# Patient Record
Sex: MALE | Race: BLACK OR AFRICAN AMERICAN | NOT HISPANIC OR LATINO | Employment: FULL TIME | URBAN - METROPOLITAN AREA
[De-identification: names, ages, dates, MRNs, and addresses within clinical notes are randomized per-mention and may not be internally consistent; named-entity substitution may affect disease eponyms.]

---

## 2022-06-17 LAB — PSA SERPL-MCNC: 13.07 NG/ML (ref 0–4)

## 2022-12-05 ENCOUNTER — TELEPHONE (OUTPATIENT)
Dept: FAMILY MEDICINE | Facility: CLINIC | Age: 67
End: 2022-12-05
Payer: COMMERCIAL

## 2022-12-05 NOTE — LETTER
AUTHORIZATION FOR RELEASE OF   CONFIDENTIAL INFORMATION    Dear Dr. Valentine,    We are seeing Souleymane Valerokhushboo Esparza, date of birth 1955, in the clinic at [facility]. JUAN Arora is the patient's PCP. Souleymane NORRIS Pamela Esparza has an outstanding lab/procedure at the time we reviewed his chart. In order to help keep his health information updated, he has authorized us to request the following medical record(s):        (  )  MAMMOGRAM                                      (  )  COLONOSCOPY      (  )  PAP SMEAR                                          ( x )  OUTSIDE LAB RESULTS     (  )  DEXA SCAN                                          (  )  EYE EXAM            (  )  FOOT EXAM                                          (  )  ENTIRE RECORD     (  )  OUTSIDE IMMUNIZATIONS                 (x  )  MOST RECENT OFFICE NOTE       Please fax records to [facility], Kathryn F Duplantis, FNP, [phone]     If you have any questions, please contact [phone]          Patient Name: Souleymane Valerokhushboo Esparza  : 1955  Patient Phone #: [phone]

## 2022-12-12 ENCOUNTER — OFFICE VISIT (OUTPATIENT)
Dept: FAMILY MEDICINE | Facility: CLINIC | Age: 67
End: 2022-12-12
Payer: COMMERCIAL

## 2022-12-12 VITALS
SYSTOLIC BLOOD PRESSURE: 136 MMHG | TEMPERATURE: 99 F | BODY MASS INDEX: 30.39 KG/M2 | HEART RATE: 58 BPM | WEIGHT: 205.19 LBS | HEIGHT: 69 IN | RESPIRATION RATE: 16 BRPM | OXYGEN SATURATION: 99 % | DIASTOLIC BLOOD PRESSURE: 84 MMHG

## 2022-12-12 DIAGNOSIS — Z00.00 ANNUAL PHYSICAL EXAM: ICD-10-CM

## 2022-12-12 DIAGNOSIS — Z01.818 PREOPERATIVE CLEARANCE: ICD-10-CM

## 2022-12-12 DIAGNOSIS — Z11.59 NEED FOR HEPATITIS C SCREENING TEST: ICD-10-CM

## 2022-12-12 DIAGNOSIS — N40.1 BENIGN PROSTATIC HYPERPLASIA WITH LOWER URINARY TRACT SYMPTOMS, SYMPTOM DETAILS UNSPECIFIED: ICD-10-CM

## 2022-12-12 DIAGNOSIS — Z11.4 SCREENING FOR HIV (HUMAN IMMUNODEFICIENCY VIRUS): ICD-10-CM

## 2022-12-12 DIAGNOSIS — Z76.89 ENCOUNTER TO ESTABLISH CARE: Primary | ICD-10-CM

## 2022-12-12 PROCEDURE — 3079F PR MOST RECENT DIASTOLIC BLOOD PRESSURE 80-89 MM HG: ICD-10-PCS | Mod: CPTII,,, | Performed by: NURSE PRACTITIONER

## 2022-12-12 PROCEDURE — 1159F MED LIST DOCD IN RCRD: CPT | Mod: CPTII,,, | Performed by: NURSE PRACTITIONER

## 2022-12-12 PROCEDURE — 3079F DIAST BP 80-89 MM HG: CPT | Mod: CPTII,,, | Performed by: NURSE PRACTITIONER

## 2022-12-12 PROCEDURE — 99203 OFFICE O/P NEW LOW 30 MIN: CPT | Mod: ,,, | Performed by: NURSE PRACTITIONER

## 2022-12-12 PROCEDURE — 1159F PR MEDICATION LIST DOCUMENTED IN MEDICAL RECORD: ICD-10-PCS | Mod: CPTII,,, | Performed by: NURSE PRACTITIONER

## 2022-12-12 PROCEDURE — 3008F BODY MASS INDEX DOCD: CPT | Mod: CPTII,,, | Performed by: NURSE PRACTITIONER

## 2022-12-12 PROCEDURE — 1160F PR REVIEW ALL MEDS BY PRESCRIBER/CLIN PHARMACIST DOCUMENTED: ICD-10-PCS | Mod: CPTII,,, | Performed by: NURSE PRACTITIONER

## 2022-12-12 PROCEDURE — 99203 PR OFFICE/OUTPT VISIT, NEW, LEVL III, 30-44 MIN: ICD-10-PCS | Mod: ,,, | Performed by: NURSE PRACTITIONER

## 2022-12-12 PROCEDURE — 3075F PR MOST RECENT SYSTOLIC BLOOD PRESS GE 130-139MM HG: ICD-10-PCS | Mod: CPTII,,, | Performed by: NURSE PRACTITIONER

## 2022-12-12 PROCEDURE — 3075F SYST BP GE 130 - 139MM HG: CPT | Mod: CPTII,,, | Performed by: NURSE PRACTITIONER

## 2022-12-12 PROCEDURE — 3008F PR BODY MASS INDEX (BMI) DOCUMENTED: ICD-10-PCS | Mod: CPTII,,, | Performed by: NURSE PRACTITIONER

## 2022-12-12 PROCEDURE — 1160F RVW MEDS BY RX/DR IN RCRD: CPT | Mod: CPTII,,, | Performed by: NURSE PRACTITIONER

## 2022-12-12 RX ORDER — TAMSULOSIN HYDROCHLORIDE 0.4 MG/1
1 CAPSULE ORAL NIGHTLY
COMMUNITY
Start: 2022-09-17 | End: 2024-03-26

## 2022-12-12 NOTE — LETTER
December 12, 2022      [facility]  [address] [place] [address]  Phone: [phone]       Patient: Souleymane Santiago   YOB: 1955  Date of Visit: 12/12/2022    To Whom It May Concern:    Toro Santiago  was at [facility] on 12/12/2022. The patient may return to work/school on 12/13/22 with no restrictions. If you have any questions or concerns, or if I can be of further assistance, please do not hesitate to contact me.    Sincerely,    Renuka Yoo MA

## 2022-12-12 NOTE — Clinical Note
Patient has multiple PSA results in his media from Dr. Bravo.  Please update his health maintenance with PSA

## 2022-12-12 NOTE — PROGRESS NOTES
Subjective:       Patient ID: Souleymane Santiago Sr. is a 67 y.o. male.    Chief Complaint: Establish Care and Benign Prostatic Hypertrophy (Has an enlarged prostate and is scheduled for surgery in February.)      HPI   This is a 67-year-old  male who presents to clinic today to establish care.  Patient has a history of BPH with obstruction.  Is being followed by Dr. Shoaib Valentine and planning on having his prostate removed in February with Dr. Bravo.  Patient has not had a primary care physician ever as an adult.  Has no past medical history besides BPH.  Review of Systems  Comprehensive review of systems negative except as stated in HPI    The patient's Health Maintenance was reviewed and the following appears to be due:   Health Maintenance Due   Topic Date Due    PROSTATE-SPECIFIC ANTIGEN  Never done    Hepatitis C Screening  Never done    Lipid Panel  Never done    HIV Screening  Never done    TETANUS VACCINE  Never done    Colorectal Cancer Screening  Never done    Shingles Vaccine (1 of 2) Never done    COVID-19 Vaccine (3 - Booster) 06/10/2021    Pneumococcal Vaccines (Age 65+) (1 - PCV) Never done    Abdominal Aortic Aneurysm Screening  Never done    Influenza Vaccine (1) Never done       Past Medical History:  Past Medical History:   Diagnosis Date    Enlarged prostate      History reviewed. No pertinent surgical history.  Review of patient's allergies indicates:  No Known Allergies  Current Outpatient Medications on File Prior to Visit   Medication Sig Dispense Refill    tamsulosin (FLOMAX) 0.4 mg Cap Take 1 capsule by mouth every evening.       No current facility-administered medications on file prior to visit.     Social History     Socioeconomic History    Marital status:    Tobacco Use    Smoking status: Former     Types: Cigarettes     Quit date: 2012     Years since quitting: 10.9    Smokeless tobacco: Never   Substance and Sexual Activity    Alcohol use: Not Currently    Drug  "use: Never    Sexual activity: Yes     Family History   Problem Relation Age of Onset    Heart disease Mother     Heart disease Father     Diabetes Sister     Diabetes Brother        Objective:       /84 (BP Location: Left arm)   Pulse (!) 58   Temp 98.9 °F (37.2 °C) (Oral)   Resp 16   Ht 5' 9" (1.753 m)   Wt 93.1 kg (205 lb 3.2 oz)   SpO2 99%   BMI 30.30 kg/m²      Physical Exam  Vitals and nursing note reviewed.   Constitutional:       Appearance: Normal appearance. He is obese.   HENT:      Head: Normocephalic and atraumatic.      Right Ear: Tympanic membrane, ear canal and external ear normal.      Left Ear: Tympanic membrane, ear canal and external ear normal.      Nose: Nose normal.      Mouth/Throat:      Mouth: Mucous membranes are moist.      Pharynx: Oropharynx is clear.   Eyes:      Extraocular Movements: Extraocular movements intact.      Conjunctiva/sclera: Conjunctivae normal.      Pupils: Pupils are equal, round, and reactive to light.   Cardiovascular:      Rate and Rhythm: Normal rate and regular rhythm.      Pulses: Normal pulses.      Heart sounds: Normal heart sounds.   Pulmonary:      Effort: Pulmonary effort is normal.      Breath sounds: Normal breath sounds.   Musculoskeletal:         General: Normal range of motion.      Cervical back: Normal range of motion and neck supple.   Skin:     General: Skin is warm and dry.   Neurological:      General: No focal deficit present.      Mental Status: He is alert and oriented to person, place, and time.   Psychiatric:         Mood and Affect: Mood normal.         Behavior: Behavior normal.         Thought Content: Thought content normal.         Judgment: Judgment normal.           Assessment and Plan       ICD-10-CM ICD-9-CM   1. Encounter to establish care  Z76.89 V65.8   2. Benign prostatic hyperplasia with lower urinary tract symptoms, symptom details unspecified  N40.1 600.01   3. Annual physical exam  Z00.00 V70.0   4. Need for " hepatitis C screening test  Z11.59 V73.89   5. Screening for HIV (human immunodeficiency virus)  Z11.4 V73.89   6. Preoperative clearance  Z01.818 V72.84        1. Encounter to establish care  Comments:  Return to clinic in 1 month for wellness and to discuss health maintenance issues.    2. Benign prostatic hyperplasia with lower urinary tract symptoms, symptom details unspecified  Overview:  Managed by Dr. Shoaib Valentine and Dr. Bravo      Assessment & Plan:  Needs clearance for radical prostatectomy, will return to clinic in 1 month for wellness and surgery clearance.      3. Annual physical exam  -     CBC Auto Differential; Future; Expected date: 01/16/2023  -     Comprehensive Metabolic Panel; Future; Expected date: 01/16/2023  -     Lipid Panel; Future; Expected date: 01/16/2023  -     TSH; Future; Expected date: 01/16/2023  -     Urinalysis; Future; Expected date: 01/16/2023    4. Need for hepatitis C screening test  -     Hepatitis C Antibody; Future; Expected date: 01/16/2023    5. Screening for HIV (human immunodeficiency virus)  -     HIV 1/2 Ag/Ab (4th Gen); Future; Expected date: 01/16/2023    6. Preoperative clearance  -     EKG 12-lead; Future; Expected date: 01/16/2023  -     X-Ray Chest PA And Lateral; Future; Expected date: 01/16/2023  -     Protime-INR; Future; Expected date: 01/16/2023           Follow up in about 6 weeks (around 1/23/2023) for Annual/preoperative clearance .

## 2022-12-12 NOTE — ASSESSMENT & PLAN NOTE
Needs clearance for radical prostatectomy, will return to clinic in 1 month for wellness and surgery clearance.

## 2022-12-13 ENCOUNTER — DOCUMENTATION ONLY (OUTPATIENT)
Dept: FAMILY MEDICINE | Facility: CLINIC | Age: 67
End: 2022-12-13
Payer: COMMERCIAL

## 2023-01-11 ENCOUNTER — TELEPHONE (OUTPATIENT)
Dept: FAMILY MEDICINE | Facility: CLINIC | Age: 68
End: 2023-01-11
Payer: COMMERCIAL

## 2023-01-11 NOTE — TELEPHONE ENCOUNTER
Are there any outstanding tasks in patient's chart?    labs  2. Do we have outstanding/pending referrals?  n    3. Has the patient been seen in an ER, Urgent Care, or admitted since last visit?  n    4. Has patient seen any other health care providers since last visit?  Dr. Bravo    5.  Has patient had any blood work or x-rays done since last visit?   Patient will complete labs at [facility]    Lab order in system    Scheduled for surgery with Dr. Bravo on Feb 15

## 2023-01-19 ENCOUNTER — HOSPITAL ENCOUNTER (OUTPATIENT)
Dept: RADIOLOGY | Facility: HOSPITAL | Age: 68
Discharge: HOME | End: 2023-01-19
Attending: NURSE PRACTITIONER
Payer: COMMERCIAL

## 2023-01-19 ENCOUNTER — HOSPITAL ENCOUNTER (OUTPATIENT)
Dept: CARDIOLOGY | Facility: HOSPITAL | Age: 68
Discharge: HOME | End: 2023-01-19
Attending: NURSE PRACTITIONER
Payer: COMMERCIAL

## 2023-01-19 DIAGNOSIS — Z01.818 PREOPERATIVE CLEARANCE: ICD-10-CM

## 2023-01-19 PROCEDURE — 93010 EKG 12-LEAD: ICD-10-PCS | Mod: ,,, | Performed by: INTERNAL MEDICINE

## 2023-01-19 PROCEDURE — 71046 X-RAY EXAM CHEST 2 VIEWS: CPT | Mod: TC

## 2023-01-19 PROCEDURE — 93010 ELECTROCARDIOGRAM REPORT: CPT | Mod: ,,, | Performed by: INTERNAL MEDICINE

## 2023-01-19 PROCEDURE — 93005 ELECTROCARDIOGRAM TRACING: CPT

## 2023-01-23 ENCOUNTER — TELEPHONE (OUTPATIENT)
Dept: FAMILY MEDICINE | Facility: CLINIC | Age: 68
End: 2023-01-23
Payer: COMMERCIAL

## 2023-01-23 NOTE — TELEPHONE ENCOUNTER
Are there any outstanding tasks in patient's chart?  n    2. Do we have outstanding/pending referrals?  n    3. Has the patient been seen in an ER, Urgent Care, or admitted since last visit?  n    4. Has patient seen any other health care providers since last visit?  Dr. Bravo    5.  Has patient had any blood work or x-rays done since last visit?   Labs and chest xray completed

## 2023-01-30 ENCOUNTER — OFFICE VISIT (OUTPATIENT)
Dept: FAMILY MEDICINE | Facility: CLINIC | Age: 68
End: 2023-01-30
Payer: COMMERCIAL

## 2023-01-30 VITALS
TEMPERATURE: 98 F | DIASTOLIC BLOOD PRESSURE: 92 MMHG | OXYGEN SATURATION: 99 % | HEIGHT: 69 IN | HEART RATE: 76 BPM | RESPIRATION RATE: 16 BRPM | SYSTOLIC BLOOD PRESSURE: 140 MMHG | BODY MASS INDEX: 31.1 KG/M2 | WEIGHT: 210 LBS

## 2023-01-30 DIAGNOSIS — Z12.11 COLON CANCER SCREENING: ICD-10-CM

## 2023-01-30 DIAGNOSIS — E78.2 MIXED HYPERLIPIDEMIA: ICD-10-CM

## 2023-01-30 DIAGNOSIS — N40.1 BENIGN PROSTATIC HYPERPLASIA WITH LOWER URINARY TRACT SYMPTOMS, SYMPTOM DETAILS UNSPECIFIED: ICD-10-CM

## 2023-01-30 DIAGNOSIS — Z11.4 SCREENING FOR HIV (HUMAN IMMUNODEFICIENCY VIRUS): ICD-10-CM

## 2023-01-30 DIAGNOSIS — Z23 NEED FOR PNEUMOCOCCAL VACCINATION: ICD-10-CM

## 2023-01-30 DIAGNOSIS — Z01.818 PREOPERATIVE CLEARANCE: ICD-10-CM

## 2023-01-30 DIAGNOSIS — Z00.00 ANNUAL PHYSICAL EXAM: Primary | ICD-10-CM

## 2023-01-30 DIAGNOSIS — R03.0 ELEVATED BLOOD PRESSURE READING: ICD-10-CM

## 2023-01-30 DIAGNOSIS — Z11.59 NEED FOR HEPATITIS C SCREENING TEST: ICD-10-CM

## 2023-01-30 PROCEDURE — 3288F FALL RISK ASSESSMENT DOCD: CPT | Mod: CPTII,,, | Performed by: NURSE PRACTITIONER

## 2023-01-30 PROCEDURE — 1101F PT FALLS ASSESS-DOCD LE1/YR: CPT | Mod: CPTII,,, | Performed by: NURSE PRACTITIONER

## 2023-01-30 PROCEDURE — 1101F PR PT FALLS ASSESS DOC 0-1 FALLS W/OUT INJ PAST YR: ICD-10-PCS | Mod: CPTII,,, | Performed by: NURSE PRACTITIONER

## 2023-01-30 PROCEDURE — 1160F PR REVIEW ALL MEDS BY PRESCRIBER/CLIN PHARMACIST DOCUMENTED: ICD-10-PCS | Mod: CPTII,,, | Performed by: NURSE PRACTITIONER

## 2023-01-30 PROCEDURE — 90677 PCV20 VACCINE IM: CPT | Mod: ,,, | Performed by: NURSE PRACTITIONER

## 2023-01-30 PROCEDURE — 1126F AMNT PAIN NOTED NONE PRSNT: CPT | Mod: CPTII,,, | Performed by: NURSE PRACTITIONER

## 2023-01-30 PROCEDURE — 3288F PR FALLS RISK ASSESSMENT DOCUMENTED: ICD-10-PCS | Mod: CPTII,,, | Performed by: NURSE PRACTITIONER

## 2023-01-30 PROCEDURE — 3080F DIAST BP >= 90 MM HG: CPT | Mod: CPTII,,, | Performed by: NURSE PRACTITIONER

## 2023-01-30 PROCEDURE — 99397 PR PREVENTIVE VISIT,EST,65 & OVER: ICD-10-PCS | Mod: 25,,, | Performed by: NURSE PRACTITIONER

## 2023-01-30 PROCEDURE — 99397 PER PM REEVAL EST PAT 65+ YR: CPT | Mod: 25,,, | Performed by: NURSE PRACTITIONER

## 2023-01-30 PROCEDURE — 1159F PR MEDICATION LIST DOCUMENTED IN MEDICAL RECORD: ICD-10-PCS | Mod: CPTII,,, | Performed by: NURSE PRACTITIONER

## 2023-01-30 PROCEDURE — 3080F PR MOST RECENT DIASTOLIC BLOOD PRESSURE >= 90 MM HG: ICD-10-PCS | Mod: CPTII,,, | Performed by: NURSE PRACTITIONER

## 2023-01-30 PROCEDURE — 3008F BODY MASS INDEX DOCD: CPT | Mod: CPTII,,, | Performed by: NURSE PRACTITIONER

## 2023-01-30 PROCEDURE — 90471 PNEUMOCOCCAL CONJUGATE VACCINE 20-VALENT: ICD-10-PCS | Mod: ,,, | Performed by: NURSE PRACTITIONER

## 2023-01-30 PROCEDURE — 1126F PR PAIN SEVERITY QUANTIFIED, NO PAIN PRESENT: ICD-10-PCS | Mod: CPTII,,, | Performed by: NURSE PRACTITIONER

## 2023-01-30 PROCEDURE — 90677 PNEUMOCOCCAL CONJUGATE VACCINE 20-VALENT: ICD-10-PCS | Mod: ,,, | Performed by: NURSE PRACTITIONER

## 2023-01-30 PROCEDURE — 90471 IMMUNIZATION ADMIN: CPT | Mod: ,,, | Performed by: NURSE PRACTITIONER

## 2023-01-30 PROCEDURE — 1160F RVW MEDS BY RX/DR IN RCRD: CPT | Mod: CPTII,,, | Performed by: NURSE PRACTITIONER

## 2023-01-30 PROCEDURE — 3008F PR BODY MASS INDEX (BMI) DOCUMENTED: ICD-10-PCS | Mod: CPTII,,, | Performed by: NURSE PRACTITIONER

## 2023-01-30 PROCEDURE — 1159F MED LIST DOCD IN RCRD: CPT | Mod: CPTII,,, | Performed by: NURSE PRACTITIONER

## 2023-01-30 PROCEDURE — 3077F PR MOST RECENT SYSTOLIC BLOOD PRESSURE >= 140 MM HG: ICD-10-PCS | Mod: CPTII,,, | Performed by: NURSE PRACTITIONER

## 2023-01-30 PROCEDURE — 3077F SYST BP >= 140 MM HG: CPT | Mod: CPTII,,, | Performed by: NURSE PRACTITIONER

## 2023-01-30 NOTE — ASSESSMENT & PLAN NOTE
Blood pressure mildly elevated in clinic today.  Was borderline at visit last month.  No history of hypertension.  Patient is nervous about upcoming surgery.  Discussed with both him and his wife that he needs to get a blood pressure cuff and start monitoring his blood pressure daily and record.  We will give him a call in 3-4 weeks to see how his blood pressure is.

## 2023-01-30 NOTE — PROGRESS NOTES
"Subjective:       Patient ID: Souleymane Santiago Sr. is a 68 y.o. male.    Chief Complaint: Annual Exam      HPI   This is a 68-year-old  male who presents to clinic today for an annual wellness exam as well as preoperative clearance for radical prostatectomy.  Patient's only past medical history is BPH with urinary tract symptoms.  Scheduled for radical prostatectomy with Dr. Bravo on .  Patient has no complaints today.    Review of Systems  Comprehensive review of systems negative except as stated in HPI    The patient's Health Maintenance was reviewed and the following appears to be due:   Health Maintenance Due   Topic Date Due    Colorectal Cancer Screening  Never done       Past Medical History:  Past Medical History:   Diagnosis Date    Enlarged prostate      History reviewed. No pertinent surgical history.  Review of patient's allergies indicates:  No Known Allergies  Current Outpatient Medications on File Prior to Visit   Medication Sig Dispense Refill    tamsulosin (FLOMAX) 0.4 mg Cap Take 1 capsule by mouth every evening.       No current facility-administered medications on file prior to visit.     Social History     Socioeconomic History    Marital status:    Tobacco Use    Smoking status: Former     Types: Cigarettes     Quit date:      Years since quittin.0    Smokeless tobacco: Never   Substance and Sexual Activity    Alcohol use: Not Currently    Drug use: Never    Sexual activity: Yes     Family History   Problem Relation Age of Onset    Heart disease Mother     Heart disease Father     Diabetes Sister     Diabetes Brother        Objective:       BP (!) 140/92   Pulse 76   Temp 97.6 °F (36.4 °C) (Oral)   Resp 16   Ht 5' 9" (1.753 m)   Wt 95.3 kg (210 lb)   SpO2 99%   BMI 31.01 kg/m²      Physical Exam  Vitals and nursing note reviewed.   Constitutional:       Appearance: Normal appearance. He is obese.   HENT:      Head: Normocephalic and atraumatic. "      Right Ear: Tympanic membrane, ear canal and external ear normal.      Left Ear: Tympanic membrane, ear canal and external ear normal.      Nose: Nose normal.      Mouth/Throat:      Mouth: Mucous membranes are moist.      Pharynx: Oropharynx is clear.   Eyes:      Extraocular Movements: Extraocular movements intact.      Conjunctiva/sclera: Conjunctivae normal.      Pupils: Pupils are equal, round, and reactive to light.   Cardiovascular:      Rate and Rhythm: Normal rate and regular rhythm.      Pulses: Normal pulses.      Heart sounds: Normal heart sounds.   Pulmonary:      Effort: Pulmonary effort is normal.      Breath sounds: Normal breath sounds.   Abdominal:      General: Abdomen is flat. Bowel sounds are normal.      Palpations: Abdomen is soft.   Musculoskeletal:         General: Normal range of motion.      Cervical back: Normal range of motion and neck supple.   Skin:     General: Skin is warm and dry.   Neurological:      General: No focal deficit present.      Mental Status: He is alert and oriented to person, place, and time.   Psychiatric:         Mood and Affect: Mood normal.         Behavior: Behavior normal.         Thought Content: Thought content normal.         Judgment: Judgment normal.       Labs  Lab Visit on 01/19/2023   Component Date Value Ref Range Status    Sodium Level 01/19/2023 140  136 - 145 mmol/L Final    Potassium Level 01/19/2023 4.0  3.5 - 5.1 mmol/L Final    Chloride 01/19/2023 107  98 - 107 mmol/L Final    Carbon Dioxide 01/19/2023 24  23 - 31 mmol/L Final    Glucose Level 01/19/2023 100  82 - 115 mg/dL Final    Blood Urea Nitrogen 01/19/2023 20.2  8.4 - 25.7 mg/dL Final    Creatinine 01/19/2023 1.09  0.73 - 1.18 mg/dL Final    Calcium Level Total 01/19/2023 9.7  8.8 - 10.0 mg/dL Final    Protein Total 01/19/2023 7.2  5.8 - 7.6 gm/dL Final    Albumin Level 01/19/2023 3.9  3.4 - 4.8 g/dL Final    Globulin 01/19/2023 3.3  2.4 - 3.5 gm/dL Final    Albumin/Globulin Ratio  01/19/2023 1.2  1.1 - 2.0 ratio Final    Bilirubin Total 01/19/2023 0.4  <=1.5 mg/dL Final    Alkaline Phosphatase 01/19/2023 67  40 - 150 unit/L Final    Alanine Aminotransferase 01/19/2023 32  0 - 55 unit/L Final    Aspartate Aminotransferase 01/19/2023 30  5 - 34 unit/L Final    eGFR 01/19/2023 >60  mls/min/1.73/m2 Final    Cholesterol Total 01/19/2023 225 (H)  <=200 mg/dL Final    HDL Cholesterol 01/19/2023 49  35 - 60 mg/dL Final    Triglyceride 01/19/2023 199 (H)  34 - 140 mg/dL Final    Cholesterol/HDL Ratio 01/19/2023 5  0 - 5 Final    Very Low Density Lipoprotein 01/19/2023 40   Final    LDL Cholesterol 01/19/2023 136.00  50.00 - 140.00 mg/dL Final    Thyroid Stimulating Hormone 01/19/2023 1.197  0.350 - 4.940 uIU/mL Final    Hepatitis C Antibody 01/19/2023 Nonreactive  Nonreactive Final    HIV 01/19/2023 Nonreactive  Nonreactive Final    PT 01/19/2023 10.4 (L)  12.5 - 14.5 seconds Final    INR 01/19/2023 1.03  0.00 - 1.30 Final    WBC 01/19/2023 8.2  4.5 - 11.5 x10(3)/mcL Final    RBC 01/19/2023 5.21  4.70 - 6.10 x10(6)/mcL Final    Hgb 01/19/2023 14.9  14.0 - 18.0 gm/dL Final    Hct 01/19/2023 47.2  42.0 - 52.0 % Final    MCV 01/19/2023 90.6  80.0 - 94.0 fL Final    MCH 01/19/2023 28.6  pg Final    MCHC 01/19/2023 31.6 (L)  33.0 - 36.0 mg/dL Final    RDW 01/19/2023 13.0  11.5 - 17.0 % Final    Platelet 01/19/2023 268  130 - 400 x10(3)/mcL Final    MPV 01/19/2023 8.5  7.4 - 10.4 fL Final    Neut % 01/19/2023 55.1  % Final    Lymph % 01/19/2023 35.9  % Final    Mono % 01/19/2023 6.5  % Final    Eos % 01/19/2023 2.1  % Final    Basophil % 01/19/2023 0.4  % Final    Lymph # 01/19/2023 2.93  0.6 - 4.6 x10(3)/mcL Final    Neut # 01/19/2023 4.50  2.1 - 9.2 x10(3)/mcL Final    Mono # 01/19/2023 0.53  0.1 - 1.3 x10(3)/mcL Final    Eos # 01/19/2023 0.17  0 - 0.9 x10(3)/mcL Final    Baso # 01/19/2023 0.03  0 - 0.2 x10(3)/mcL Final    IG# 01/19/2023 0.00  0 - 0.04 x10(3)/mcL Final    IG% 01/19/2023 0.0  % Final    Documentation Only on 12/13/2022   Component Date Value Ref Range Status    PSA Total 06/17/2022 13.07 (H)  0.00 - 4.00 ng/mL Final     X-Ray Chest PA And Lateral  Narrative: EXAMINATION:  XR CHEST PA AND LATERAL    CLINICAL HISTORY:  Encounter for other preprocedural examination    TECHNIQUE:  Single frontal view of the chest was performed.    COMPARISON:  None    FINDINGS:  The lungs are clear, with normal appearance of pulmonary vasculature and no pleural effusion or pneumothorax.    The cardiac silhouette is normal in size. The hilar and mediastinal contours are unremarkable.    Bones are intact.  Impression: No acute abnormality.    Electronically signed by: Stormy John  Date:    01/19/2023  Time:    18:54     Results for orders placed or performed during the hospital encounter of 01/19/23   EKG 12-lead    Collection Time: 01/19/23  5:28 PM    Narrative    Test Reason : Z01.818,    Vent. Rate : 059 BPM     Atrial Rate : 059 BPM     P-R Int : 154 ms          QRS Dur : 090 ms      QT Int : 390 ms       P-R-T Axes : 033 031 020 degrees     QTc Int : 386 ms    Sinus bradycardia  Otherwise normal ECG  No previous ECGs available  Confirmed by Lisandro Moya MD ([id number]) on 1/21/2023 3:25:01 AM    Referred By: GISELLE RANDHAWA           Confirmed By:Lisandro Moya MD        Assessment and Plan       ICD-10-CM ICD-9-CM   1. Annual physical exam  Z00.00 V70.0   2. Preoperative clearance  Z01.818 V72.84   3. Need for hepatitis C screening test  Z11.59 V73.89   4. Screening for HIV (human immunodeficiency virus)  Z11.4 V73.89   5. Colon cancer screening  Z12.11 V76.51   6. Need for pneumococcal vaccination  Z23 V03.82   7. Benign prostatic hyperplasia with lower urinary tract symptoms, symptom details unspecified  N40.1 600.01   8. Elevated blood pressure reading  R03.0 796.2   9. Mixed hyperlipidemia  E78.2 272.2        1. Annual physical exam  Overview:  Annual exam yearly in January    Orders:  -     CBC Auto  Differential; Future; Expected date: 01/30/2024  -     Comprehensive Metabolic Panel; Future; Expected date: 01/30/2024  -     Lipid Panel; Future; Expected date: 01/30/2024  -     TSH; Future; Expected date: 01/30/2024  -     Hemoglobin A1C; Future; Expected date: 01/30/2024    2. Preoperative clearance  Comments:  EKG, chest x-ray, labs all reviewed, patient cleared for robotic simple prostatectomy.  H&P, labs, EKG, chest x-ray faxed to Dr. Bravo's office.    3. Need for hepatitis C screening test  Comments:  Nonreactive    4. Screening for HIV (human immunodeficiency virus)  Comments:  Nonreactive    5. Colon cancer screening  Assessment & Plan:  Discussed colon cancer screening options with patient including colonoscopy and Cologuard.  Patient would like to complete a colonoscopy, referral sent to [facility].     Orders:  -     Ambulatory referral/consult to Gastroenterology; Future; Expected date: 02/06/2023    6. Need for pneumococcal vaccination  -     (In Office Administered) Pneumococcal Conjugate Vaccine (20 Valent) (IM)    7. Benign prostatic hyperplasia with lower urinary tract symptoms, symptom details unspecified  Overview:  Managed by Dr. Shoaib Valentine and Dr. Bravo      Assessment & Plan:  Cleared for simple robotic prostatectomy to be completed on February 15th with Dr. Bravo.      8. Elevated blood pressure reading  Assessment & Plan:  Blood pressure mildly elevated in clinic today.  Was borderline at visit last month.  No history of hypertension.  Patient is nervous about upcoming surgery.  Discussed with both him and his wife that he needs to get a blood pressure cuff and start monitoring his blood pressure daily and record.  We will give him a call in 3-4 weeks to see how his blood pressure is.      9. Mixed hyperlipidemia  Assessment & Plan:  Total cholesterol 225, HDL 49, triglycerides 199, .  Discussed lifestyle and diet modification.  Will recheck in 1 year.             Follow  up in about 1 year (around 1/30/2024) for Annual.

## 2023-01-30 NOTE — ASSESSMENT & PLAN NOTE
Discussed colon cancer screening options with patient including colonoscopy and Cologuard.  Patient would like to complete a colonoscopy, referral sent to [facility].

## 2023-01-30 NOTE — ASSESSMENT & PLAN NOTE
Total cholesterol 225, HDL 49, triglycerides 199, .  Discussed lifestyle and diet modification.  Will recheck in 1 year.

## 2023-04-25 ENCOUNTER — PATIENT MESSAGE (OUTPATIENT)
Dept: RESEARCH | Facility: HOSPITAL | Age: 68
End: 2023-04-25
Payer: COMMERCIAL

## 2023-05-01 ENCOUNTER — PATIENT MESSAGE (OUTPATIENT)
Dept: ADMINISTRATIVE | Facility: HOSPITAL | Age: 68
End: 2023-05-01
Payer: COMMERCIAL

## 2023-05-01 PROBLEM — Z00.00 ANNUAL PHYSICAL EXAM: Status: RESOLVED | Noted: 2023-01-30 | Resolved: 2023-05-01

## 2023-05-02 ENCOUNTER — PATIENT MESSAGE (OUTPATIENT)
Dept: RESEARCH | Facility: HOSPITAL | Age: 68
End: 2023-05-02
Payer: COMMERCIAL

## 2023-05-10 LAB — PSA: 0.77

## 2023-05-16 ENCOUNTER — PATIENT MESSAGE (OUTPATIENT)
Dept: RESEARCH | Facility: HOSPITAL | Age: 68
End: 2023-05-16
Payer: COMMERCIAL

## 2023-07-25 ENCOUNTER — PATIENT MESSAGE (OUTPATIENT)
Dept: ADMINISTRATIVE | Facility: HOSPITAL | Age: 68
End: 2023-07-25
Payer: COMMERCIAL

## 2023-08-11 ENCOUNTER — HOSPITAL ENCOUNTER (EMERGENCY)
Facility: HOSPITAL | Age: 68
Discharge: HOME | End: 2023-08-11
Attending: SPECIALIST
Payer: COMMERCIAL

## 2023-08-11 VITALS
TEMPERATURE: 98 F | SYSTOLIC BLOOD PRESSURE: 159 MMHG | BODY MASS INDEX: 32.49 KG/M2 | OXYGEN SATURATION: 97 % | HEART RATE: 59 BPM | WEIGHT: 220 LBS | DIASTOLIC BLOOD PRESSURE: 79 MMHG | RESPIRATION RATE: 18 BRPM

## 2023-08-11 DIAGNOSIS — M54.2 NECK PAIN: ICD-10-CM

## 2023-08-11 PROCEDURE — 96372 THER/PROPH/DIAG INJ SC/IM: CPT | Performed by: SPECIALIST

## 2023-08-11 PROCEDURE — 99284 EMERGENCY DEPT VISIT MOD MDM: CPT

## 2023-08-11 PROCEDURE — 25000003 PHARM REV CODE 250: Performed by: SPECIALIST

## 2023-08-11 PROCEDURE — 63600175 PHARM REV CODE 636 W HCPCS: Performed by: SPECIALIST

## 2023-08-11 RX ORDER — KETOROLAC TROMETHAMINE 30 MG/ML
60 INJECTION, SOLUTION INTRAMUSCULAR; INTRAVENOUS
Status: COMPLETED | OUTPATIENT
Start: 2023-08-11 | End: 2023-08-11

## 2023-08-11 RX ORDER — CYCLOBENZAPRINE HCL 10 MG
10 TABLET ORAL
Status: COMPLETED | OUTPATIENT
Start: 2023-08-11 | End: 2023-08-11

## 2023-08-11 RX ORDER — DICLOFENAC SODIUM 50 MG/1
50 TABLET, DELAYED RELEASE ORAL 3 TIMES DAILY PRN
Qty: 30 TABLET | Refills: 0 | Status: SHIPPED | OUTPATIENT
Start: 2023-08-11 | End: 2024-03-26

## 2023-08-11 RX ORDER — CYCLOBENZAPRINE HCL 5 MG
5 TABLET ORAL 3 TIMES DAILY PRN
Qty: 30 TABLET | Refills: 0 | Status: SHIPPED | OUTPATIENT
Start: 2023-08-11 | End: 2023-08-21

## 2023-08-11 RX ADMIN — KETOROLAC TROMETHAMINE 60 MG: 60 INJECTION, SOLUTION INTRAMUSCULAR at 09:08

## 2023-08-11 RX ADMIN — CYCLOBENZAPRINE 10 MG: 10 TABLET, FILM COATED ORAL at 09:08

## 2023-08-12 NOTE — ED PROVIDER NOTES
Encounter Date: 2023       History     Chief Complaint   Patient presents with    Neck Pain     Non traumatic neck pain x 3 days with difficulty sleeping. Pt taking ibuprofen 400mg but not helping very well. Last dose last night     Patient with left lateral neck pain over the last 3 days, no injury, no focal deficits; he notes pain radiating from the left lateral neck into the trapezius area; states it is hard to sleep; no relief with ibuprofen    The history is provided by the patient.     Review of patient's allergies indicates:  No Known Allergies  Past Medical History:   Diagnosis Date    Enlarged prostate      No past surgical history on file.  Family History   Problem Relation Age of Onset    Heart disease Mother     Heart disease Father     Diabetes Sister     Diabetes Brother      Social History     Tobacco Use    Smoking status: Former     Current packs/day: 0.00     Types: Cigarettes     Quit date:      Years since quittin.6    Smokeless tobacco: Never   Substance Use Topics    Alcohol use: Not Currently    Drug use: Never     Review of Systems   Constitutional: Negative.    HENT: Negative.     Respiratory: Negative.     Cardiovascular: Negative.    Gastrointestinal: Negative.    Genitourinary: Negative.    Musculoskeletal: Negative.    Neurological: Negative.        Physical Exam     Initial Vitals [23]   BP Pulse Resp Temp SpO2   (!) 159/79 (!) 59 18 97.9 °F (36.6 °C) 97 %      MAP       --         Physical Exam    Nursing note and vitals reviewed.  Constitutional: He appears well-developed and well-nourished.   HENT:   Head: Normocephalic and atraumatic.   Eyes: EOM are normal. Pupils are equal, round, and reactive to light.   Neck: Neck supple.   Lower left lateral neck tenderness to palpation along the paraspinous musculature and some tenderness to the left medial trapezius area, no deformity, no erythema, no rash   Normal range of motion.  Cardiovascular:  Normal rate,  regular rhythm and normal heart sounds.           Pulmonary/Chest: Breath sounds normal.   Musculoskeletal:         General: Normal range of motion.      Cervical back: Normal range of motion and neck supple.     Neurological: He is alert and oriented to person, place, and time.   Skin: Skin is warm and dry.         ED Course   Procedures  Labs Reviewed - No data to display       Imaging Results              X-Ray Cervical Spine AP And Lateral (Preliminary result)  Result time 08/11/23 21:45:29      Wet Read by Miguel Angel Etienne MD (08/11/23 21:45:29, [facility] - Emergency Dept, Emergency Medicine)    No acute osseous abnormality;                                     Medications   ketorolac injection 60 mg (60 mg Intramuscular Given 8/11/23 2128)   cyclobenzaprine tablet 10 mg (10 mg Oral Given 8/11/23 2128)     Medical Decision Making:   History:   Old Medical Records: I decided to obtain old medical records.  Initial Assessment:   Patient with left lateral neck pain over the last 3 days, no injury, no focal deficits; he notes pain radiating from the left lateral neck into the trapezius area; states it is hard to sleep; no relief with ibuprofen  Differential Diagnosis:   Cervical strain, DDD, OA  Independently Interpreted Test(s):   I have ordered and independently interpreted X-rays - see prior notes.  ED Management:  Patient's neck x-ray was unremarkable for osseous abnormalities but did show lack of curvature and possible small osteophyte formation; patient was given Toradol 60 mg IM and Flexeril 10 mg p.o. in the emergency room; he was given a prescription for diclofenac 50 mg t.i.d. as needed for pain and Flexeril for muscle spasm; patient's blood pressure was mildly elevated; patient instructed to follow up with his primary care physician and consider MRI of neck pain is not improving; return emergency room if there are any focal deficits or muscular weakness                 Patient Vitals for the past  24 hrs:   BP Temp Temp src Pulse Resp SpO2 Weight   08/11/23 2044 (!) 159/79 97.9 °F (36.6 °C) Oral (!) 59 18 97 % 99.8 kg (220 lb)     The patient is resting comfortably and in no acute distress.   He states that his symptoms have improved after treatment in Emergency Department. I personally discussed his test results and treatment plan.  Gave strict ED precautions.  Specific conditions for return to the emergency department and importance of follow up with his primary care provided or the physician listed on the discharge instructions.  Patient voices understanding and agrees to the plan discussed. All patients' questions have been answered at this time.   He has remained hemodynamically stable throughout entire stay in ED and is stable for discharge home.          Clinical Impression:   Final diagnoses:  [M54.2] Neck pain        ED Disposition Condition    Discharge Stable          ED Prescriptions       Medication Sig Dispense Start Date End Date Auth. Provider    diclofenac (VOLTAREN) 50 MG EC tablet Take 1 tablet (50 mg total) by mouth 3 (three) times daily as needed (Pain). 30 tablet 8/11/2023 -- Miguel Angel Etienne MD    cyclobenzaprine (FLEXERIL) 5 MG tablet Take 1 tablet (5 mg total) by mouth 3 (three) times daily as needed for Muscle spasms. 30 tablet 8/11/2023 8/21/2023 Miguel Angel Etienne MD          Follow-up Information       Follow up With Specialties Details Why Contact Info    Nneka Hernandez, P Family Medicine In 1 week As needed [address] [place] [id number]  [phone]               Miguel Angel Etienne MD  08/11/23 9856

## 2023-08-12 NOTE — ED NOTES
Patient c/o pain to muscle below left side of neck down into upper left back near shoulder. Pain 10/10, no acute distress noted

## 2023-09-19 ENCOUNTER — PATIENT MESSAGE (OUTPATIENT)
Dept: FAMILY MEDICINE | Facility: CLINIC | Age: 68
End: 2023-09-19
Payer: COMMERCIAL

## 2024-01-25 ENCOUNTER — TELEPHONE (OUTPATIENT)
Dept: FAMILY MEDICINE | Facility: CLINIC | Age: 69
End: 2024-01-25
Payer: COMMERCIAL

## 2024-01-25 ENCOUNTER — PATIENT MESSAGE (OUTPATIENT)
Dept: FAMILY MEDICINE | Facility: CLINIC | Age: 69
End: 2024-01-25
Payer: COMMERCIAL

## 2024-01-25 NOTE — TELEPHONE ENCOUNTER
No answer on 1/25/24 at 8:59.  Left message reminding patient about his upcoming visit with labs prior.  Message also sent thought portal.

## 2024-02-07 ENCOUNTER — PATIENT OUTREACH (OUTPATIENT)
Dept: ADMINISTRATIVE | Facility: HOSPITAL | Age: 69
End: 2024-02-07
Payer: COMMERCIAL

## 2024-02-07 NOTE — LETTER
AUTHORIZATION FOR RELEASE OF   CONFIDENTIAL INFORMATION    Dear OMA Hernandez ,    We are seeing Souleymane Santiago , date of birth 1955, in the clinic at [facility]. Nneka Hernandez FNP is the patient's PCP. Souleymane Valerokhushboo Esparza has an outstanding lab/procedure at the time we reviewed his chart. In order to help keep his health information updated, he has authorized us to request the following medical record(s):        (  )  MAMMOGRAM                                      (  )  COLONOSCOPY      (  )  PAP SMEAR                                          (  )  OUTSIDE LAB RESULTS     (  )  DEXA SCAN                                          (  )  EYE EXAM            (  )  FOOT EXAM                                          (  )  ENTIRE RECORD     (  )  OUTSIDE IMMUNIZATIONS                 ( * )  PSA LAB         Please fax records to [facility], [place], Kathryn F., FNP, [phone].     If you have any questions, please contact Miguelina Khan ccc at [phone].           Patient Name: Souleymane Valerokhushboo Esparza  : 1955  Patient Phone #: [phone]

## 2024-02-07 NOTE — PROGRESS NOTES
Population Health. Out Reach. Reviewing patient's chart for quality metrics. call for pt re: colonoscopy, wife reports pt is at work and she can give him a message to call, she also reports pt has miss last pcp appt and r/s for 3/26/24 and need for colonoscopy. Advised and gave wife # to Asiya Serrano to call for appt and to call if need anything in the future. Wife voices understanding to instructions given.     2/16/2024-  The following record(s)  below were uploaded for Health Maintenance .  5/10/2023 PSA lab report

## 2024-02-08 ENCOUNTER — HOSPITAL ENCOUNTER (EMERGENCY)
Facility: HOSPITAL | Age: 69
Discharge: HOME | End: 2024-02-08
Attending: FAMILY MEDICINE
Payer: COMMERCIAL

## 2024-02-08 VITALS
OXYGEN SATURATION: 99 % | SYSTOLIC BLOOD PRESSURE: 148 MMHG | DIASTOLIC BLOOD PRESSURE: 70 MMHG | BODY MASS INDEX: 32.58 KG/M2 | RESPIRATION RATE: 18 BRPM | HEART RATE: 57 BPM | HEIGHT: 69 IN | TEMPERATURE: 98 F | WEIGHT: 220 LBS
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
## 2024-02-08 DIAGNOSIS — S39.012A STRAIN OF LUMBAR REGION, INITIAL ENCOUNTER: ICD-10-CM

## 2024-02-08 PROCEDURE — 25000003 PHARM REV CODE 250: Performed by: FAMILY MEDICINE

## 2024-02-08 PROCEDURE — 99284 EMERGENCY DEPT VISIT MOD MDM: CPT | Mod: 25

## 2024-02-08 RX ORDER — METHOCARBAMOL 500 MG/1
1000 TABLET, FILM COATED ORAL 3 TIMES DAILY
Qty: 30 TABLET | Refills: 0 | Status: SHIPPED | OUTPATIENT
Start: 2024-02-08 | End: 2024-02-13

## 2024-02-08 RX ORDER — KETOROLAC TROMETHAMINE 10 MG/1
10 TABLET, FILM COATED ORAL EVERY 6 HOURS
Qty: 15 TABLET | Refills: 0 | Status: SHIPPED | OUTPATIENT
Start: 2024-02-08 | End: 2024-02-13

## 2024-02-08 RX ORDER — IBUPROFEN 800 MG/1
800 TABLET ORAL
Status: COMPLETED | OUTPATIENT
Start: 2024-02-08 | End: 2024-02-08

## 2024-02-08 RX ORDER — ACETAMINOPHEN 500 MG
1000 TABLET ORAL
Status: COMPLETED | OUTPATIENT
Start: 2024-02-08 | End: 2024-02-08

## 2024-02-08 RX ADMIN — ACETAMINOPHEN 1000 MG: 500 TABLET ORAL at 08:02

## 2024-02-08 RX ADMIN — IBUPROFEN 800 MG: 800 TABLET, FILM COATED ORAL at 08:02

## 2024-02-08 NOTE — Clinical Note
"Souleymane Frenchadelina Santiago was seen and treated in our emergency department on 2/8/2024.  He may return to work on 02/12/2024.       If you have any questions or concerns, please don't hesitate to call.      Chi Carr MD / Maico Gautam RN    "

## 2024-02-08 NOTE — Clinical Note
Juanita Santiago accompanied their spouse to the emergency department on 2/8/2024. They may return to work on 02/09/2024.      If you have any questions or concerns, please don't hesitate to call.      Chi Carr MD / Maico Gautam RN Melolabial Transposition Flap Text: We discussed various closure modalities with the patient, including healing by second intention, primary closure, skin graft and various flaps.  The location and configuration of the defect indicated that Cheek to nose (Melolabial) Transposition flap would result in the least disturbance of the position and function of the surrounding anatomic structures, and provide the best result.  The technique, its benefits, alternatives and risks were discussed with the patient.  The patient underwent the procedure as follows: The patient was positioned supine on the operating room table.  The area of the defect and the surrounding skin were anesthetized with buffered 1% lidocaine with epinephrine.  The area was washed with chlorhexidine.  Sterile drapes were applied. \\n\\nThe wound edges were debeveled and extensively undermined.  Melolabbial transposition flap was designed, incised, and elevated.  Hemostasis was achieved with spot electrodesiccation.  The flap was transposed into position to cover the primary defect and a key suture was used to secure the flap into place.  Additional buried interrupted sutures were used to close the flap.  The standing cones so created by the design of the flap was removed to fat by triangulation.  Final cutaneous approximation was achieved.  The closure was manually tested and found to be sound for strength and hemostasis.\\n\\nThe defect edges were debeveled with a #15 scalpel blade.  Given the location of the defect and the proximity to free margins a melolabial flap was deemed most appropriate.  Using a sterile surgical marker, an appropriate melolabial transposition flap was drawn incorporating the defect.    The area thus outlined was incised deep to adipose tissue with a #15 scalpel blade.  The skin margins were undermined to an appropriate distance in all directions utilizing iris scissors.
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
## 2024-02-08 NOTE — DISCHARGE INSTRUCTIONS
Recommend no heavy lifting for the next few days until back pain resolved follow up with PCP next week for recheck

## 2024-03-19 ENCOUNTER — TELEPHONE (OUTPATIENT)
Dept: FAMILY MEDICINE | Facility: CLINIC | Age: 69
End: 2024-03-19
Payer: COMMERCIAL

## 2024-03-19 ENCOUNTER — PATIENT MESSAGE (OUTPATIENT)
Dept: FAMILY MEDICINE | Facility: CLINIC | Age: 69
End: 2024-03-19
Payer: COMMERCIAL

## 2024-03-19 ENCOUNTER — LAB VISIT (OUTPATIENT)
Dept: LAB | Facility: HOSPITAL | Age: 69
End: 2024-03-19
Attending: NURSE PRACTITIONER
Payer: COMMERCIAL

## 2024-03-19 DIAGNOSIS — Z00.00 ANNUAL PHYSICAL EXAM: ICD-10-CM

## 2024-03-19 LAB
ALBUMIN SERPL-MCNC: 3.7 G/DL (ref 3.4–4.8)
ALBUMIN/GLOB SERPL: 1.2 RATIO (ref 1.1–2)
ALP SERPL-CCNC: 65 UNIT/L (ref 40–150)
ALT SERPL-CCNC: 35 UNIT/L (ref 0–55)
AST SERPL-CCNC: 30 UNIT/L (ref 5–34)
BASOPHILS # BLD AUTO: 0.04 X10(3)/MCL
BASOPHILS NFR BLD AUTO: 0.5 %
BILIRUB SERPL-MCNC: 0.4 MG/DL
BUN SERPL-MCNC: 14.3 MG/DL (ref 8.4–25.7)
CALCIUM SERPL-MCNC: 9.4 MG/DL (ref 8.8–10)
CHLORIDE SERPL-SCNC: 108 MMOL/L (ref 98–107)
CHOLEST SERPL-MCNC: 212 MG/DL
CHOLEST/HDLC SERPL: 5 {RATIO} (ref 0–5)
CO2 SERPL-SCNC: 27 MMOL/L (ref 23–31)
CREAT SERPL-MCNC: 0.88 MG/DL (ref 0.73–1.18)
EOSINOPHIL # BLD AUTO: 0.19 X10(3)/MCL (ref 0–0.9)
EOSINOPHIL NFR BLD AUTO: 2.4 %
ERYTHROCYTE [DISTWIDTH] IN BLOOD BY AUTOMATED COUNT: 13 % (ref 11.5–17)
EST. AVERAGE GLUCOSE BLD GHB EST-MCNC: 122.6 MG/DL
GFR SERPLBLD CREATININE-BSD FMLA CKD-EPI: >60 MLS/MIN/1.73/M2
GLOBULIN SER-MCNC: 3.2 GM/DL (ref 2.4–3.5)
GLUCOSE SERPL-MCNC: 92 MG/DL (ref 82–115)
HBA1C MFR BLD: 5.9 %
HCT VFR BLD AUTO: 45.3 % (ref 42–52)
HDLC SERPL-MCNC: 43 MG/DL (ref 35–60)
HGB BLD-MCNC: 14.8 G/DL (ref 14–18)
IMM GRANULOCYTES # BLD AUTO: 0.01 X10(3)/MCL (ref 0–0.04)
IMM GRANULOCYTES NFR BLD AUTO: 0.1 %
LDLC SERPL CALC-MCNC: 142 MG/DL (ref 50–140)
LYMPHOCYTES # BLD AUTO: 3 X10(3)/MCL (ref 0.6–4.6)
LYMPHOCYTES NFR BLD AUTO: 37.8 %
MCH RBC QN AUTO: 29.9 PG (ref 27–31)
MCHC RBC AUTO-ENTMCNC: 32.7 G/DL (ref 33–36)
MCV RBC AUTO: 91.5 FL (ref 80–94)
MONOCYTES # BLD AUTO: 0.54 X10(3)/MCL (ref 0.1–1.3)
MONOCYTES NFR BLD AUTO: 6.8 %
NEUTROPHILS # BLD AUTO: 4.15 X10(3)/MCL (ref 2.1–9.2)
NEUTROPHILS NFR BLD AUTO: 52.4 %
PLATELET # BLD AUTO: 234 X10(3)/MCL (ref 130–400)
PMV BLD AUTO: 8.7 FL (ref 7.4–10.4)
POTASSIUM SERPL-SCNC: 4.5 MMOL/L (ref 3.5–5.1)
PROT SERPL-MCNC: 6.9 GM/DL (ref 5.8–7.6)
RBC # BLD AUTO: 4.95 X10(6)/MCL (ref 4.7–6.1)
SODIUM SERPL-SCNC: 142 MMOL/L (ref 136–145)
TRIGL SERPL-MCNC: 134 MG/DL (ref 34–140)
TSH SERPL-ACNC: 1.4 UIU/ML (ref 0.35–4.94)
VLDLC SERPL CALC-MCNC: 27 MG/DL
WBC # SPEC AUTO: 7.93 X10(3)/MCL (ref 4.5–11.5)

## 2024-03-19 PROCEDURE — 80053 COMPREHEN METABOLIC PANEL: CPT

## 2024-03-19 PROCEDURE — 84443 ASSAY THYROID STIM HORMONE: CPT

## 2024-03-19 PROCEDURE — 36415 COLL VENOUS BLD VENIPUNCTURE: CPT

## 2024-03-19 PROCEDURE — 83036 HEMOGLOBIN GLYCOSYLATED A1C: CPT

## 2024-03-19 PROCEDURE — 85025 COMPLETE CBC W/AUTO DIFF WBC: CPT

## 2024-03-19 PROCEDURE — 80061 LIPID PANEL: CPT

## 2024-03-19 NOTE — TELEPHONE ENCOUNTER
Attempted to contact patient for previsit on 3/19/24 at 10:30.  Left a message reminding patient about his upcoming visit with labs prior to appointment. Message sent to patient portal.

## 2024-03-26 ENCOUNTER — OFFICE VISIT (OUTPATIENT)
Dept: FAMILY MEDICINE | Facility: CLINIC | Age: 69
End: 2024-03-26
Payer: COMMERCIAL

## 2024-03-26 VITALS
OXYGEN SATURATION: 97 % | WEIGHT: 204.81 LBS | DIASTOLIC BLOOD PRESSURE: 88 MMHG | SYSTOLIC BLOOD PRESSURE: 136 MMHG | HEIGHT: 69 IN | HEART RATE: 59 BPM | BODY MASS INDEX: 30.33 KG/M2

## 2024-03-26 DIAGNOSIS — Z13.6 SCREENING FOR AAA (ABDOMINAL AORTIC ANEURYSM): ICD-10-CM

## 2024-03-26 DIAGNOSIS — Z00.00 ANNUAL PHYSICAL EXAM: Primary | ICD-10-CM

## 2024-03-26 DIAGNOSIS — R73.03 PREDIABETES: ICD-10-CM

## 2024-03-26 DIAGNOSIS — Z12.11 COLON CANCER SCREENING: ICD-10-CM

## 2024-03-26 DIAGNOSIS — E78.2 MIXED HYPERLIPIDEMIA: ICD-10-CM

## 2024-03-26 DIAGNOSIS — Z13.6 ENCOUNTER FOR SCREENING FOR CARDIOVASCULAR DISORDERS: ICD-10-CM

## 2024-03-26 DIAGNOSIS — N40.1 BENIGN PROSTATIC HYPERPLASIA WITH LOWER URINARY TRACT SYMPTOMS, SYMPTOM DETAILS UNSPECIFIED: ICD-10-CM

## 2024-03-26 PROCEDURE — 1160F RVW MEDS BY RX/DR IN RCRD: CPT | Mod: CPTII,,, | Performed by: NURSE PRACTITIONER

## 2024-03-26 PROCEDURE — 1126F AMNT PAIN NOTED NONE PRSNT: CPT | Mod: CPTII,,, | Performed by: NURSE PRACTITIONER

## 2024-03-26 PROCEDURE — 1159F MED LIST DOCD IN RCRD: CPT | Mod: CPTII,,, | Performed by: NURSE PRACTITIONER

## 2024-03-26 PROCEDURE — 3044F HG A1C LEVEL LT 7.0%: CPT | Mod: CPTII,,, | Performed by: NURSE PRACTITIONER

## 2024-03-26 PROCEDURE — 3075F SYST BP GE 130 - 139MM HG: CPT | Mod: CPTII,,, | Performed by: NURSE PRACTITIONER

## 2024-03-26 PROCEDURE — 3288F FALL RISK ASSESSMENT DOCD: CPT | Mod: CPTII,,, | Performed by: NURSE PRACTITIONER

## 2024-03-26 PROCEDURE — 3008F BODY MASS INDEX DOCD: CPT | Mod: CPTII,,, | Performed by: NURSE PRACTITIONER

## 2024-03-26 PROCEDURE — 1101F PT FALLS ASSESS-DOCD LE1/YR: CPT | Mod: CPTII,,, | Performed by: NURSE PRACTITIONER

## 2024-03-26 PROCEDURE — 3079F DIAST BP 80-89 MM HG: CPT | Mod: CPTII,,, | Performed by: NURSE PRACTITIONER

## 2024-03-26 PROCEDURE — 99397 PER PM REEVAL EST PAT 65+ YR: CPT | Mod: ,,, | Performed by: NURSE PRACTITIONER

## 2024-03-26 RX ORDER — ROSUVASTATIN CALCIUM 10 MG/1
10 TABLET, COATED ORAL DAILY
Qty: 90 TABLET | Refills: 3 | Status: SHIPPED | OUTPATIENT
Start: 2024-03-26 | End: 2025-03-26

## 2024-03-26 NOTE — ASSESSMENT & PLAN NOTE
Total cholesterol 212, .  Start rosuvastatin 10 mg daily.  Follow-up in 6 months with repeat labs and coronary artery calcium score prior.

## 2024-03-26 NOTE — ASSESSMENT & PLAN NOTE
Significantly improved since undergoing robotic prostatectomy.  Continue yearly follow-up with Urology for yearly PSA.

## 2024-03-26 NOTE — PROGRESS NOTES
Subjective:       Patient ID: Souleymane Santiago Sr. is a 69 y.o. male.    Chief Complaint: Annual Exam      HPI   This is a 69-year-old  male who presents to clinic today for an annual wellness exam.  Patient reports overall he is doing well.  Reports that he did have his prostate removed since last time he saw me in also had hernia repair.  No complaints today.  Review of Systems  Comprehensive review of systems negative except as stated in HPI    The patient's Health Maintenance was reviewed and the following appears to be due:   Health Maintenance Due   Topic Date Due    Colorectal Cancer Screening  Never done    Abdominal Aortic Aneurysm Screening  Never done       Past Medical History:  Past Medical History:   Diagnosis Date    Enlarged prostate      Past Surgical History:   Procedure Laterality Date    INGUINAL HERNIA REPAIR  10/10/2023    SUPRAPUBIC PROSTATECTOMY  02/15/2023     Review of patient's allergies indicates:  No Known Allergies  Current Outpatient Medications on File Prior to Visit   Medication Sig Dispense Refill    [DISCONTINUED] diclofenac (VOLTAREN) 50 MG EC tablet Take 1 tablet (50 mg total) by mouth 3 (three) times daily as needed (Pain). 30 tablet 0    [DISCONTINUED] tamsulosin (FLOMAX) 0.4 mg Cap Take 1 capsule by mouth every evening.       No current facility-administered medications on file prior to visit.     Social History     Socioeconomic History    Marital status:    Tobacco Use    Smoking status: Former     Current packs/day: 0.00     Types: Cigarettes     Quit date:      Years since quittin.2    Smokeless tobacco: Never   Substance and Sexual Activity    Alcohol use: Not Currently    Drug use: Never    Sexual activity: Yes     Social Determinants of Health     Financial Resource Strain: Low Risk  (3/26/2024)    Overall Financial Resource Strain (CARDIA)     Difficulty of Paying Living Expenses: Not hard at all   Food Insecurity: No Food Insecurity  "(3/26/2024)    Hunger Vital Sign     Worried About Running Out of Food in the Last Year: Never true     Ran Out of Food in the Last Year: Never true   Transportation Needs: No Transportation Needs (3/26/2024)    PRAPARE - Transportation     Lack of Transportation (Medical): No     Lack of Transportation (Non-Medical): No   Physical Activity: Sufficiently Active (3/26/2024)    Exercise Vital Sign     Days of Exercise per Week: 7 days     Minutes of Exercise per Session: 60 min   Stress: No Stress Concern Present (3/26/2024)    [employer] [place] [employer] - Occupational Stress Questionnaire     Feeling of Stress : Not at all   Social Connections: Socially Integrated (3/26/2024)    Social Connection and Isolation Panel [NHANES]     Frequency of Communication with Friends and Family: More than three times a week     Frequency of Social Gatherings with Friends and Family: More than three times a week     Attends [employer] Services: More than 4 times per year     Active Member of Clubs or Organizations: Yes     Attends Club or Organization Meetings: More than 4 times per year     Marital Status:    Housing Stability: Unknown (3/26/2024)    Housing Stability Vital Sign     Unable to Pay for Housing in the Last Year: No     Unstable Housing in the Last Year: No     Family History   Problem Relation Age of Onset    Heart disease Mother     Heart disease Father     Diabetes Sister     Diabetes Brother        Objective:       /88 (BP Location: Left arm, Patient Position: Sitting, BP Method: Large (Manual))   Pulse (!) 59   Ht 5' 9" (1.753 m)   Wt 92.9 kg (204 lb 12.8 oz)   SpO2 97%   BMI 30.24 kg/m²      Physical Exam  Vitals and nursing note reviewed.   Constitutional:       Appearance: Normal appearance. He is obese.   HENT:      Head: Normocephalic and atraumatic.      Right Ear: Tympanic membrane, ear canal and external ear normal.      Left Ear: Tympanic membrane, ear canal and external ear " normal.      Nose: Nose normal.      Mouth/Throat:      Mouth: Mucous membranes are moist.      Pharynx: Oropharynx is clear.   Eyes:      Extraocular Movements: Extraocular movements intact.      Conjunctiva/sclera: Conjunctivae normal.      Pupils: Pupils are equal, round, and reactive to light.   Cardiovascular:      Rate and Rhythm: Normal rate and regular rhythm.      Pulses: Normal pulses.      Heart sounds: Normal heart sounds.   Pulmonary:      Effort: Pulmonary effort is normal.      Breath sounds: Normal breath sounds.   Abdominal:      General: Abdomen is flat. Bowel sounds are normal.      Palpations: Abdomen is soft.   Musculoskeletal:         General: Normal range of motion.      Cervical back: Normal range of motion and neck supple.   Skin:     General: Skin is warm and dry.   Neurological:      General: No focal deficit present.      Mental Status: He is alert and oriented to person, place, and time.   Psychiatric:         Mood and Affect: Mood normal.         Behavior: Behavior normal.         Thought Content: Thought content normal.         Judgment: Judgment normal.         Labs  Lab Visit on 03/19/2024   Component Date Value Ref Range Status    Sodium Level 03/19/2024 142  136 - 145 mmol/L Final    Potassium Level 03/19/2024 4.5  3.5 - 5.1 mmol/L Final    Chloride 03/19/2024 108 (H)  98 - 107 mmol/L Final    Carbon Dioxide 03/19/2024 27  23 - 31 mmol/L Final    Glucose Level 03/19/2024 92  82 - 115 mg/dL Final    Blood Urea Nitrogen 03/19/2024 14.3  8.4 - 25.7 mg/dL Final    Creatinine 03/19/2024 0.88  0.73 - 1.18 mg/dL Final    Calcium Level Total 03/19/2024 9.4  8.8 - 10.0 mg/dL Final    Protein Total 03/19/2024 6.9  5.8 - 7.6 gm/dL Final    Albumin Level 03/19/2024 3.7  3.4 - 4.8 g/dL Final    Globulin 03/19/2024 3.2  2.4 - 3.5 gm/dL Final    Albumin/Globulin Ratio 03/19/2024 1.2  1.1 - 2.0 ratio Final    Bilirubin Total 03/19/2024 0.4  <=1.5 mg/dL Final    Alkaline Phosphatase 03/19/2024 65   40 - 150 unit/L Final    Alanine Aminotransferase 03/19/2024 35  0 - 55 unit/L Final    Aspartate Aminotransferase 03/19/2024 30  5 - 34 unit/L Final    eGFR 03/19/2024 >60  mls/min/1.73/m2 Final    Cholesterol Total 03/19/2024 212 (H)  <=200 mg/dL Final    HDL Cholesterol 03/19/2024 43  35 - 60 mg/dL Final    Triglyceride 03/19/2024 134  34 - 140 mg/dL Final    Cholesterol/HDL Ratio 03/19/2024 5  0 - 5 Final    Very Low Density Lipoprotein 03/19/2024 27   Final    LDL Cholesterol 03/19/2024 142.00 (H)  50.00 - 140.00 mg/dL Final    TSH 03/19/2024 1.404  0.350 - 4.940 uIU/mL Final    Hemoglobin A1c 03/19/2024 5.9  <=7.0 % Final    Estimated Average Glucose 03/19/2024 122.6  mg/dL Final    WBC 03/19/2024 7.93  4.50 - 11.50 x10(3)/mcL Final    RBC 03/19/2024 4.95  4.70 - 6.10 x10(6)/mcL Final    Hgb 03/19/2024 14.8  14.0 - 18.0 g/dL Final    Hct 03/19/2024 45.3  42.0 - 52.0 % Final    MCV 03/19/2024 91.5  80.0 - 94.0 fL Final    MCH 03/19/2024 29.9  27.0 - 31.0 pg Final    MCHC 03/19/2024 32.7 (L)  33.0 - 36.0 g/dL Final    RDW 03/19/2024 13.0  11.5 - 17.0 % Final    Platelet 03/19/2024 234  130 - 400 x10(3)/mcL Final    MPV 03/19/2024 8.7  7.4 - 10.4 fL Final    Neut % 03/19/2024 52.4  % Final    Lymph % 03/19/2024 37.8  % Final    Mono % 03/19/2024 6.8  % Final    Eos % 03/19/2024 2.4  % Final    Basophil % 03/19/2024 0.5  % Final    Lymph # 03/19/2024 3.00  0.6 - 4.6 x10(3)/mcL Final    Neut # 03/19/2024 4.15  2.1 - 9.2 x10(3)/mcL Final    Mono # 03/19/2024 0.54  0.1 - 1.3 x10(3)/mcL Final    Eos # 03/19/2024 0.19  0 - 0.9 x10(3)/mcL Final    Baso # 03/19/2024 0.04  <=0.2 x10(3)/mcL Final    IG# 03/19/2024 0.01  0 - 0.04 x10(3)/mcL Final    IG% 03/19/2024 0.1  % Final   Patient Outreach on 02/07/2024   Component Date Value Ref Range Status    PSA 05/10/2023 0.77   Final       Assessment and Plan       ICD-10-CM ICD-9-CM   1. Annual physical exam  Z00.00 V70.0   2. Colon cancer screening  Z12.11 V76.51   3. Benign  prostatic hyperplasia with lower urinary tract symptoms, symptom details unspecified  N40.1 600.01   4. Mixed hyperlipidemia  E78.2 272.2   5. Encounter for screening for cardiovascular disorders  Z13.6 V81.2   6. Screening for AAA (abdominal aortic aneurysm)  Z13.6 V81.2   7. Prediabetes  R73.03 790.29        1. Annual physical exam  Overview:  Annual exam yearly in March      2. Colon cancer screening  Assessment & Plan:  Referral recent to [facility], patient again given the number to call and schedule.    Orders:  -     Ambulatory referral/consult to Gastroenterology; Future; Expected date: 04/02/2024    3. Benign prostatic hyperplasia with lower urinary tract symptoms, symptom details unspecified  Overview:  Managed by Dr. Shoaib Valentine and Dr. Bravo    02/15/2023 - robotic prostatectomy per Dr. Bravo      Assessment & Plan:  Significantly improved since undergoing robotic prostatectomy.  Continue yearly follow-up with Urology for yearly PSA.      4. Mixed hyperlipidemia  Overview:  03/26/2024 - start rosuvastatin 10 mg daily    Assessment & Plan:  Total cholesterol 212, .  Start rosuvastatin 10 mg daily.  Follow-up in 6 months with repeat labs and coronary artery calcium score prior.    Orders:  -     rosuvastatin (CRESTOR) 10 MG tablet; Take 1 tablet (10 mg total) by mouth once daily.  Dispense: 90 tablet; Refill: 3  -     Comprehensive Metabolic Panel; Future; Expected date: 09/26/2024  -     Lipid Panel; Future; Expected date: 09/26/2024    5. Encounter for screening for cardiovascular disorders  -     CT Calcium Scoring Cardiac; Future; Expected date: 09/26/2024    6. Screening for AAA (abdominal aortic aneurysm)  -     US Abdominal Aorta; Future; Expected date: 09/26/2024    7. Prediabetes  Overview:  Diet-controlled    Assessment & Plan:  Hemoglobin A1c 5.9, discussed prediabetes and prediabetic diet, will recheck in 6 months.    Orders:  -     Hemoglobin A1C; Future; Expected date:  09/26/2024  -     Comprehensive Metabolic Panel; Future; Expected date: 09/26/2024           Follow up in about 6 months (around 9/26/2024) for follow up.

## 2024-05-28 ENCOUNTER — DOCUMENTATION ONLY (OUTPATIENT)
Dept: FAMILY MEDICINE | Facility: CLINIC | Age: 69
End: 2024-05-28
Payer: COMMERCIAL

## 2024-05-28 LAB — CRC RECOMMENDATION EXT: NORMAL

## 2024-07-01 PROBLEM — Z00.00 ANNUAL PHYSICAL EXAM: Status: RESOLVED | Noted: 2023-01-30 | Resolved: 2024-07-01

## 2024-09-24 ENCOUNTER — HOSPITAL ENCOUNTER (OUTPATIENT)
Dept: RADIOLOGY | Facility: HOSPITAL | Age: 69
Discharge: HOME | End: 2024-09-24
Attending: NURSE PRACTITIONER
Payer: COMMERCIAL

## 2024-09-24 ENCOUNTER — TELEPHONE (OUTPATIENT)
Dept: FAMILY MEDICINE | Facility: CLINIC | Age: 69
End: 2024-09-24
Payer: COMMERCIAL

## 2024-09-24 DIAGNOSIS — Z13.6 ENCOUNTER FOR SCREENING FOR CARDIOVASCULAR DISORDERS: ICD-10-CM

## 2024-09-24 DIAGNOSIS — Z13.6 SCREENING FOR AAA (ABDOMINAL AORTIC ANEURYSM): ICD-10-CM

## 2024-09-24 PROCEDURE — 76775 US EXAM ABDO BACK WALL LIM: CPT | Mod: TC

## 2024-09-24 PROCEDURE — 75571 CT HRT W/O DYE W/CA TEST: CPT | Mod: TC

## 2024-09-24 NOTE — TELEPHONE ENCOUNTER
Attempted to contact patient for previsit on 9/24/24 at 9:15. LV reminding patient about his upcoming visit with labs needed prior to appointment.

## 2024-09-30 PROBLEM — R93.1 ELEVATED CORONARY ARTERY CALCIUM SCORE: Status: ACTIVE | Noted: 2024-09-30

## 2024-10-01 ENCOUNTER — LAB VISIT (OUTPATIENT)
Dept: LAB | Facility: HOSPITAL | Age: 69
End: 2024-10-01
Attending: NURSE PRACTITIONER
Payer: COMMERCIAL

## 2024-10-01 ENCOUNTER — OFFICE VISIT (OUTPATIENT)
Dept: FAMILY MEDICINE | Facility: CLINIC | Age: 69
End: 2024-10-01
Payer: COMMERCIAL

## 2024-10-01 VITALS
HEART RATE: 55 BPM | HEIGHT: 69 IN | DIASTOLIC BLOOD PRESSURE: 74 MMHG | BODY MASS INDEX: 30.66 KG/M2 | OXYGEN SATURATION: 98 % | WEIGHT: 207 LBS | RESPIRATION RATE: 16 BRPM | TEMPERATURE: 98 F | SYSTOLIC BLOOD PRESSURE: 132 MMHG

## 2024-10-01 DIAGNOSIS — Z23 NEED FOR INFLUENZA VACCINATION: ICD-10-CM

## 2024-10-01 DIAGNOSIS — R73.03 PREDIABETES: ICD-10-CM

## 2024-10-01 DIAGNOSIS — E78.2 MIXED HYPERLIPIDEMIA: Primary | ICD-10-CM

## 2024-10-01 DIAGNOSIS — R93.1 ELEVATED CORONARY ARTERY CALCIUM SCORE: ICD-10-CM

## 2024-10-01 DIAGNOSIS — Z13.6 SCREENING FOR AAA (ABDOMINAL AORTIC ANEURYSM): ICD-10-CM

## 2024-10-01 DIAGNOSIS — E78.2 MIXED HYPERLIPIDEMIA: ICD-10-CM

## 2024-10-01 DIAGNOSIS — K63.5 POLYP OF COLON, UNSPECIFIED PART OF COLON, UNSPECIFIED TYPE: ICD-10-CM

## 2024-10-01 DIAGNOSIS — B35.6 JOCK ITCH: ICD-10-CM

## 2024-10-01 PROBLEM — K57.30 DIVERTICULOSIS OF LARGE INTESTINE WITHOUT PERFORATION OR ABSCESS WITHOUT BLEEDING: Status: ACTIVE | Noted: 2024-05-28

## 2024-10-01 PROBLEM — Z12.11 COLON CANCER SCREENING: Status: RESOLVED | Noted: 2023-01-30 | Resolved: 2024-10-01

## 2024-10-01 LAB
ALBUMIN SERPL-MCNC: 3.8 G/DL (ref 3.4–4.8)
ALBUMIN/GLOB SERPL: 1.3 RATIO (ref 1.1–2)
ALP SERPL-CCNC: 65 UNIT/L (ref 40–150)
ALT SERPL-CCNC: 43 UNIT/L (ref 0–55)
ANION GAP SERPL CALC-SCNC: 7 MEQ/L
AST SERPL-CCNC: 33 UNIT/L (ref 5–34)
BILIRUB SERPL-MCNC: 0.8 MG/DL
BUN SERPL-MCNC: 12.4 MG/DL (ref 8.4–25.7)
CALCIUM SERPL-MCNC: 9.2 MG/DL (ref 8.8–10)
CHLORIDE SERPL-SCNC: 109 MMOL/L (ref 98–107)
CHOLEST SERPL-MCNC: 186 MG/DL
CHOLEST/HDLC SERPL: 4 {RATIO} (ref 0–5)
CO2 SERPL-SCNC: 27 MMOL/L (ref 23–31)
CREAT SERPL-MCNC: 1.05 MG/DL (ref 0.73–1.18)
CREAT/UREA NIT SERPL: 12
EST. AVERAGE GLUCOSE BLD GHB EST-MCNC: 137 MG/DL
GFR SERPLBLD CREATININE-BSD FMLA CKD-EPI: >60 ML/MIN/1.73/M2
GLOBULIN SER-MCNC: 2.9 GM/DL (ref 2.4–3.5)
GLUCOSE SERPL-MCNC: 100 MG/DL (ref 82–115)
HBA1C MFR BLD: 6.4 %
HDLC SERPL-MCNC: 45 MG/DL (ref 35–60)
LDLC SERPL CALC-MCNC: 121 MG/DL (ref 50–140)
POTASSIUM SERPL-SCNC: 4.2 MMOL/L (ref 3.5–5.1)
PROT SERPL-MCNC: 6.7 GM/DL (ref 5.8–7.6)
SODIUM SERPL-SCNC: 143 MMOL/L (ref 136–145)
TRIGL SERPL-MCNC: 101 MG/DL (ref 34–140)
VLDLC SERPL CALC-MCNC: 20 MG/DL

## 2024-10-01 PROCEDURE — 1160F RVW MEDS BY RX/DR IN RCRD: CPT | Mod: CPTII,,, | Performed by: NURSE PRACTITIONER

## 2024-10-01 PROCEDURE — 1126F AMNT PAIN NOTED NONE PRSNT: CPT | Mod: CPTII,,, | Performed by: NURSE PRACTITIONER

## 2024-10-01 PROCEDURE — 3075F SYST BP GE 130 - 139MM HG: CPT | Mod: CPTII,,, | Performed by: NURSE PRACTITIONER

## 2024-10-01 PROCEDURE — 3288F FALL RISK ASSESSMENT DOCD: CPT | Mod: CPTII,,, | Performed by: NURSE PRACTITIONER

## 2024-10-01 PROCEDURE — 3078F DIAST BP <80 MM HG: CPT | Mod: CPTII,,, | Performed by: NURSE PRACTITIONER

## 2024-10-01 PROCEDURE — 1101F PT FALLS ASSESS-DOCD LE1/YR: CPT | Mod: CPTII,,, | Performed by: NURSE PRACTITIONER

## 2024-10-01 PROCEDURE — 80053 COMPREHEN METABOLIC PANEL: CPT

## 2024-10-01 PROCEDURE — 1159F MED LIST DOCD IN RCRD: CPT | Mod: CPTII,,, | Performed by: NURSE PRACTITIONER

## 2024-10-01 PROCEDURE — 3008F BODY MASS INDEX DOCD: CPT | Mod: CPTII,,, | Performed by: NURSE PRACTITIONER

## 2024-10-01 PROCEDURE — 83036 HEMOGLOBIN GLYCOSYLATED A1C: CPT

## 2024-10-01 PROCEDURE — 80061 LIPID PANEL: CPT

## 2024-10-01 PROCEDURE — 99214 OFFICE O/P EST MOD 30 MIN: CPT | Mod: 25,,, | Performed by: NURSE PRACTITIONER

## 2024-10-01 PROCEDURE — 3044F HG A1C LEVEL LT 7.0%: CPT | Mod: CPTII,,, | Performed by: NURSE PRACTITIONER

## 2024-10-01 PROCEDURE — 90653 IIV ADJUVANT VACCINE IM: CPT | Mod: ,,, | Performed by: NURSE PRACTITIONER

## 2024-10-01 PROCEDURE — 90471 IMMUNIZATION ADMIN: CPT | Mod: ,,, | Performed by: NURSE PRACTITIONER

## 2024-10-01 PROCEDURE — 36415 COLL VENOUS BLD VENIPUNCTURE: CPT

## 2024-10-01 RX ORDER — KETOCONAZOLE 20 MG/G
CREAM TOPICAL DAILY
Qty: 60 G | Refills: 2 | Status: SHIPPED | OUTPATIENT
Start: 2024-10-01

## 2024-10-01 RX ORDER — ASPIRIN 81 MG/1
81 TABLET ORAL DAILY
Qty: 90 TABLET | Refills: 3 | Status: SHIPPED | OUTPATIENT
Start: 2024-10-01 | End: 2025-10-01

## 2024-10-01 RX ORDER — ROSUVASTATIN CALCIUM 10 MG/1
10 TABLET, COATED ORAL DAILY
Qty: 90 TABLET | Refills: 3 | Status: SHIPPED | OUTPATIENT
Start: 2024-10-01 | End: 2025-10-01

## 2024-10-01 NOTE — ASSESSMENT & PLAN NOTE
Patient never started the rosuvastatin prescribed at last visit, forgot about the prescription.  Cholesterol did actually improved some, total cholesterol 186, .  Still recommend starting rosuvastatin 10 mg daily due to elevated coronary artery calcium score.  Recheck lipid panel in 3 months.

## 2024-10-01 NOTE — ASSESSMENT & PLAN NOTE
Long discussion today with patient about CAD and risk of CAD with elevated coronary artery calcium score.  He never started the rosuvastatin prescribed at last visit, he forgot.  New prescription of rosuvastatin 10 mg daily sent to pharmacy along with aspirin 81 mg daily.  Refer to [facility] in [place] for further workup.

## 2024-10-01 NOTE — PATIENT INSTRUCTIONS
The [place]oop on Statins: Things You Should Know.       Heart disease is the number one cause of death in the [place]. Around 1 in 20 adults aged 20 and older have coronary artery disease [1] and every 33 seconds a person dies from cardiovascular disease in the [place] [2]. The good news is that you can reduce your risk.    What are statins and how do they work?    Statins are a medication that is effective at lowering cholesterol and your risk of heart attack and stroke.     Statins lower low-density lipoprotein (LDL) cholesterol known as the bad cholesterol. Over time, just like the pipes in your house, your arteries or pipes can become filled with waxy cholesterol plaques that can build up and block blood flow to your heart. This can lead to heart attacks and strokes. Statins can draw the cholesterol out of plaques and slow down the production of cholesterol in the liver.       Figure 1: Reproduced from: What Is Atherosclerosis? National Heart, Lung, and Blood [place]. Available at http://www.nhlbi.nih.gov/health/health-topics/topics/atherosclerosis/.    What are the benefits and potential risks of statins?    Statins can decrease the risk of major heart events by 31% and have a 21% decrease in death from heart disease [4]. In addition to lowering bad cholesterol, status increase your HDL or good cholesterol. They also are known to have anti-inflammatory properties, decrease the risk of blood clots, and improve the lining of blood vessels [5].     Statins are safe and effective for most people, but there are some rare side effects that impact a small number of patients. Most patients do not experience any side effects and up to 90% of patients do not experience muscle aches. Statins may also mildly increase glucose levels [6]. If a patient experiences side effects your doctor might try a different statin, lower dose, or decrease the frequency.     There are some common misconceptions about the  potential side effects and risks of taking statins which have been debunked in scientific studies. Common myths include statins having a negative effect on liver health and cognitive health. Scientific studies monitoring thousands of patients have shown that statins do not cause dementia. Use of statins can improve your long-term cognitive and overall health by reducing your risk of stroke and heart disease.     Who are prescribed statins?    Statins are used to treat patients with high cholesterol and many doctors prescribe statins for people at risk of heart disease and stroke. Statins are also recommended for patients with diabetes, those with elevated coronary calcium scores and patients with a history of stroke, heart attack, and other cardiovascular events.     Talk to your doctor about your risk and whether statins might be right for you.    References    1.   Jorge Alberto CW, Martina AW, Lorraine ZI, Xavier CAM, Jordan P, Merrick CL, John CM, Stephen AZ, Adri AK, Kulwinder AE, [place]-Reena Y, Ellesley MSV, Jose KR, Oh-Nenitam C, Jelani S, Shree G, Robert DG, Hirematcherelle S, El JE, Syeda R, Lakisha DS, Ovi D, Neymar DA, Tommy J, Ma J, Magnani JW, Manav ED, Laura ME, Kelvin SD, Kurt NI, Charlene R, Emiliana M, Luis Alberto GA, Bryn NS, On MP, Jahaira EL, Stef SS, Phoebe JH, Jorge NY, Mark ND, Mark SS, Monica K, Mayur SS; American Heart Association [place] on Epidemiology and Prevention Statistics Committee and Stroke Statistics Subcommittee. Heart Disease and Stroke Statistics-2023 Update: A Report From the American Heart Association. Circulation. 2023 Feb 21;147(8):j73-j587. doi: 10.1161/CIR.[id number]. Epub 2023 Jan 25. Erratum in: Circulation. 2023 Feb 21;147(8):e622. Erratum in: Circulation. 2023 Jul 25;148(4):e4. PMID: 55732273.  2. National Center for Health Statistics. Multiple Cause of Death 2001-7301 on [facility] WONDER Database. Accessed October 28, 2023.  3. [facility].  Exploitation of Aspergillus terreus for the Production of Natural Statins. J Fungi (Basel). 2016 Apr 30;2(2):13. doi: 10.3390/inv0329105. PMID: 64161912; PMCID: SXC3000279.  4. Cat HOOD, Bogdan J, Bonilla S. Effect of statins on risk of coronary disease: a meta-analysis of randomized controlled trials. YADI. 1999 Dec 22-29;282(24):2340-6. doi: 10.1001/yadi.282.24.2340. PMID: 34256813.  5. Rachael-Russell I, Casal-Jordan M, Kenneth AL. Statins: pros and cons. Med Clin (Barc). 2018 May 23;150(10):398-402. doi: 10.1016/j.medcli.2017.11.030. Epub 2017 Dec 29. PMID: 49994830; PMCID: KBL7167254.  6. Tim S, Rafael A, Rafael S. Statin Therapy: Review of Safety and Potential Side Effects. Acta Cardiol Sin. 2016 Nov;32(6):631-639. doi: 10.6515/kav26083894r. PMID: 09247453; PMCID: [id number].

## 2024-10-01 NOTE — PROGRESS NOTES
Subjective:       Patient ID: Souleymane Santiago Sr. is a 69 y.o. male.    Chief Complaint: Hyperlipidemia (6m fu) and Pre-diabetes (6m fu)      HPI   This has a 69-year-old  male who presents to clinic today for six-month follow-up for hyperlipidemia, prediabetes.  Reports did have colonoscopy.  Reports has a rash to bilateral groin that is itchy and worse when he sweats.  Review of Systems  Comprehensive review of systems negative except as stated in HPI    The patient's Health Maintenance was reviewed and the following appears to be due:   There are no preventive care reminders to display for this patient.    Past Medical History:  Past Medical History:   Diagnosis Date    Enlarged prostate      Past Surgical History:   Procedure Laterality Date    INGUINAL HERNIA REPAIR  10/10/2023    SUPRAPUBIC PROSTATECTOMY  02/15/2023     Review of patient's allergies indicates:  No Known Allergies  Current Outpatient Medications on File Prior to Visit   Medication Sig Dispense Refill    [DISCONTINUED] rosuvastatin (CRESTOR) 10 MG tablet Take 1 tablet (10 mg total) by mouth once daily. (Patient not taking: Reported on 10/1/2024) 90 tablet 3     No current facility-administered medications on file prior to visit.     Social History     Socioeconomic History    Marital status:    Tobacco Use    Smoking status: Former     Current packs/day: 0.00     Types: Cigarettes     Quit date:      Years since quittin.7    Smokeless tobacco: Never   Substance and Sexual Activity    Alcohol use: Not Currently    Drug use: Never    Sexual activity: Yes     Social Drivers of Health     Financial Resource Strain: Low Risk  (3/26/2024)    Overall Financial Resource Strain (CARDIA)     Difficulty of Paying Living Expenses: Not hard at all   Food Insecurity: No Food Insecurity (3/26/2024)    Hunger Vital Sign     Worried About Running Out of Food in the Last Year: Never true     Ran Out of Food in the Last Year: Never  "true   Transportation Needs: No Transportation Needs (3/26/2024)    PRAPARE - Transportation     Lack of Transportation (Medical): No     Lack of Transportation (Non-Medical): No   Physical Activity: Sufficiently Active (3/26/2024)    Exercise Vital Sign     Days of Exercise per Week: 7 days     Minutes of Exercise per Session: 60 min   Stress: No Stress Concern Present (3/26/2024)    [place] [employer] - Occupational Stress Questionnaire     Feeling of Stress : Not at all   Housing Stability: Unknown (3/26/2024)    Housing Stability Vital Sign     Unable to Pay for Housing in the Last Year: No     Unstable Housing in the Last Year: No     Family History   Problem Relation Name Age of Onset    Heart disease Mother      Heart disease Father      Diabetes Sister      Diabetes Brother         Objective:       /74 (BP Location: Left arm)   Pulse (!) 55   Temp 97.8 °F (36.6 °C) (Oral)   Resp 16   Ht 5' 9" (1.753 m)   Wt 93.9 kg (207 lb)   SpO2 98%   BMI 30.57 kg/m²      Physical Exam  Vitals and nursing note reviewed.   Constitutional:       Appearance: Normal appearance. He is obese.   HENT:      Head: Normocephalic and atraumatic.      Right Ear: Tympanic membrane, ear canal and external ear normal.      Left Ear: Tympanic membrane, ear canal and external ear normal.      Nose: Nose normal.      Mouth/Throat:      Mouth: Mucous membranes are moist.      Pharynx: Oropharynx is clear.   Eyes:      Extraocular Movements: Extraocular movements intact.      Conjunctiva/sclera: Conjunctivae normal.      Pupils: Pupils are equal, round, and reactive to light.   Cardiovascular:      Rate and Rhythm: Normal rate and regular rhythm.      Pulses: Normal pulses.      Heart sounds: Normal heart sounds.   Pulmonary:      Effort: Pulmonary effort is normal.      Breath sounds: Normal breath sounds.   Musculoskeletal:         General: Normal range of motion.      Cervical back: Normal range of motion " and neck supple.   Skin:     General: Skin is warm and dry.      Comments: Beefy red rash bilateral groin   Neurological:      General: No focal deficit present.      Mental Status: He is alert and oriented to person, place, and time.   Psychiatric:         Mood and Affect: Mood normal.         Behavior: Behavior normal.         Thought Content: Thought content normal.         Judgment: Judgment normal.         Labs  Lab Visit on 10/01/2024   Component Date Value Ref Range Status    Hemoglobin A1c 10/01/2024 6.4  <=7.0 % Final    Estimated Average Glucose 10/01/2024 137.0  mg/dL Final    Sodium 10/01/2024 143  136 - 145 mmol/L Final    Potassium 10/01/2024 4.2  3.5 - 5.1 mmol/L Final    Chloride 10/01/2024 109 (H)  98 - 107 mmol/L Final    CO2 10/01/2024 27  23 - 31 mmol/L Final    Glucose 10/01/2024 100  82 - 115 mg/dL Final    Blood Urea Nitrogen 10/01/2024 12.4  8.4 - 25.7 mg/dL Final    Creatinine 10/01/2024 1.05  0.73 - 1.18 mg/dL Final    Calcium 10/01/2024 9.2  8.8 - 10.0 mg/dL Final    Protein Total 10/01/2024 6.7  5.8 - 7.6 gm/dL Final    Albumin 10/01/2024 3.8  3.4 - 4.8 g/dL Final    Globulin 10/01/2024 2.9  2.4 - 3.5 gm/dL Final    Albumin/Globulin Ratio 10/01/2024 1.3  1.1 - 2.0 ratio Final    Bilirubin Total 10/01/2024 0.8  <=1.5 mg/dL Final    ALP 10/01/2024 65  40 - 150 unit/L Final    ALT 10/01/2024 43  0 - 55 unit/L Final    AST 10/01/2024 33  5 - 34 unit/L Final    eGFR 10/01/2024 >60  mL/min/1.73/m2 Final    Anion Gap 10/01/2024 7.0  mEq/L Final    BUN/Creatinine Ratio 10/01/2024 12   Final    Cholesterol Total 10/01/2024 186  <=200 mg/dL Final    HDL Cholesterol 10/01/2024 45  35 - 60 mg/dL Final    Triglyceride 10/01/2024 101  34 - 140 mg/dL Final    Cholesterol/HDL Ratio 10/01/2024 4  0 - 5 Final    Very Low Density Lipoprotein 10/01/2024 20   Final    LDL Cholesterol 10/01/2024 121.00  50.00 - 140.00 mg/dL Final   Documentation Only on 05/28/2024   Component Date Value Ref Range Status    CRC  Recommendation External 05/28/2024 Repeat colonoscopy in 3 years   Final       Assessment and Plan       ICD-10-CM ICD-9-CM   1. Mixed hyperlipidemia  E78.2 272.2   2. Elevated coronary artery calcium score  R93.1 414.00   3. Prediabetes  R73.03 790.29   4. Polyp of colon, unspecified part of colon, unspecified type  K63.5 211.3   5. Need for influenza vaccination  Z23 V04.81   6. Jock itch  B35.6 110.3   7. Screening for AAA (abdominal aortic aneurysm)  Z13.6 V81.2        1. Mixed hyperlipidemia  Overview:  10/01/2024 - start rosuvastatin 10 mg daily    Assessment & Plan:  Patient never started the rosuvastatin prescribed at last visit, forgot about the prescription.  Cholesterol did actually improved some, total cholesterol 186, .  Still recommend starting rosuvastatin 10 mg daily due to elevated coronary artery calcium score.  Recheck lipid panel in 3 months.    Orders:  -     rosuvastatin (CRESTOR) 10 MG tablet; Take 1 tablet (10 mg total) by mouth once daily.  Dispense: 90 tablet; Refill: 3  -     aspirin (ECOTRIN) 81 MG EC tablet; Take 1 tablet (81 mg total) by mouth once daily.  Dispense: 90 tablet; Refill: 3  -     Comprehensive Metabolic Panel; Future; Expected date: 01/01/2025  -     Lipid Panel; Future; Expected date: 01/01/2025    2. Elevated coronary artery calcium score  Overview:  09/24/2024 - CACS 275 - Definite, at least moderate burden of coronary plaque; risk of CAD - mild CAD highly likely, significant narrowing(s) possible.     Assessment & Plan:  Long discussion today with patient about CAD and risk of CAD with elevated coronary artery calcium score.  He never started the rosuvastatin prescribed at last visit, he forgot.  New prescription of rosuvastatin 10 mg daily sent to pharmacy along with aspirin 81 mg daily.  Refer to [facility] in [place] for further workup.    Orders:  -     aspirin (ECOTRIN) 81 MG EC tablet; Take 1 tablet (81 mg total) by mouth once daily.  Dispense: 90 tablet;  Refill: 3  -     Ambulatory referral/consult to Cardiology; Future; Expected date: 10/08/2024    3. Prediabetes  Overview:  03/26/2024 - Hgb A1c 5.9  10/01/2024 - Hgb A1c 6.4    Assessment & Plan:  Hemoglobin A1c actually worse.  Now 6.4.  Again discussed with patient diabetes, prediabetes, diabetic diet.  Instructed to decrease carbohydrates and sugars and diet.  Instructed to increase exercise, follow-up in 3 months with labs prior.    Orders:  -     Hemoglobin A1C; Future; Expected date: 01/01/2025  -     Comprehensive Metabolic Panel; Future; Expected date: 01/01/2025    4. Polyp of colon, unspecified part of colon, unspecified type  Overview:  Dr Gaetano Valentine  Colonoscopy 05/28/2024 - 2 polyps, repeat 3 years (May 2027)    Assessment & Plan:  Recent colonoscopy reviewed, recommend repeat in 3 years as recommended per GI.      5. Need for influenza vaccination  -     influenza (adjuvanted) (Fluad) 45 mcg/0.5 mL IM vaccine (> or = 66 yo) 0.5 mL    6. Jock itch  Comments:  Ketoconazole cream as needed  Orders:  -     ketoconazole (NIZORAL) 2 % cream; Apply topically once daily.  Dispense: 60 g; Refill: 2    7. Screening for AAA (abdominal aortic aneurysm)  Comments:  No AAA on recent screening ultrasound           Follow up in about 3 months (around 1/1/2025) for follow up labs prior .

## 2024-10-01 NOTE — ASSESSMENT & PLAN NOTE
Hemoglobin A1c actually worse.  Now 6.4.  Again discussed with patient diabetes, prediabetes, diabetic diet.  Instructed to decrease carbohydrates and sugars and diet.  Instructed to increase exercise, follow-up in 3 months with labs prior.

## 2024-10-21 ENCOUNTER — TELEPHONE (OUTPATIENT)
Dept: FAMILY MEDICINE | Facility: CLINIC | Age: 69
End: 2024-10-21
Payer: COMMERCIAL

## 2024-10-30 ENCOUNTER — PATIENT MESSAGE (OUTPATIENT)
Dept: RESEARCH | Facility: HOSPITAL | Age: 69
End: 2024-10-30
Payer: COMMERCIAL

## 2024-11-19 ENCOUNTER — TELEPHONE (OUTPATIENT)
Dept: FAMILY MEDICINE | Facility: CLINIC | Age: 69
End: 2024-11-19
Payer: COMMERCIAL

## 2024-11-19 NOTE — TELEPHONE ENCOUNTER
----- Message from Kareem sent at 11/19/2024  4:01 PM CST -----  Who Called: Juanita Santiago (spouse)    Caller is requesting assistance/information from provider's office.    Symptoms (please be specific):    How long has patient had these symptoms:    List of preferred pharmacies on file (remove unneeded): [unfilled]  If different, enter pharmacy into here including location and phone number:       Preferred Method of Contact: Phone Call  Patient's Preferred Phone Number on File: [phone]   Best Call Back Number, if different:  Additional Information: States pt saw pcp in regards to having trouble for urinating and when he went to do labs prior to appt on 10/01 he also had orders for other orders for his neck and heart. Pt has another appt on 11/20 and 11/26 states he has more orders for tests on neck and heart, but has no knowledge as to if the orders came from PCP and why is he getting orders to be done. States he has no knowledge to having any issues with his neck or heart, would like clarification before his appt tomorrow. If not needed he will not go to appt.

## 2024-12-27 ENCOUNTER — TELEPHONE (OUTPATIENT)
Dept: FAMILY MEDICINE | Facility: CLINIC | Age: 69
End: 2024-12-27
Payer: COMMERCIAL

## 2024-12-27 DIAGNOSIS — Z01.818 PRE-OPERATIVE CLEARANCE: Primary | ICD-10-CM

## 2024-12-27 NOTE — TELEPHONE ENCOUNTER
----- Message from Kin sent at 12/27/2024  8:33 AM CST -----  .Type:  Needs Medical Advice    Who Called: Chelly with [facility]   Symptoms (please be specific):    How long has patient had these symptoms:    Pharmacy name and phone #:    Would the patient rather a call back or a response via [facility]?   Best Call Back Number: [phone]  Additional Information: Requested call back from nurse she is looking for status of surgery clearance form she had sent to office. Please call her back thanks.  
Pt scheduled 1/2/25 for sx clearance   CBC  CMP  CXR ordered to be done at [facility]  
[de-identified] : Assessment: Right Ankle Sprain/Injury/Tendinitis. \par \par Plan:\par #1. Hold off on advanced imaging at this time.\par #2. Antiinflammatories/Tylenol as needed.\par #3. RICE therapy\par #4. Limit activities and use of ankle for 2 weeks.  Physical therapy prescription given.\par #5. All questions answered.  Patient will follow-up in office as needed.  If symptoms worsen or patient has concerns, they may call office and/or come into office sooner if needed.

## 2024-12-28 ENCOUNTER — HOSPITAL ENCOUNTER (OUTPATIENT)
Dept: RADIOLOGY | Facility: HOSPITAL | Age: 69
Discharge: HOME | End: 2024-12-28
Attending: NURSE PRACTITIONER
Payer: COMMERCIAL

## 2024-12-28 DIAGNOSIS — Z01.818 PRE-OPERATIVE CLEARANCE: ICD-10-CM

## 2024-12-28 PROCEDURE — 71046 X-RAY EXAM CHEST 2 VIEWS: CPT | Mod: TC

## 2025-01-02 ENCOUNTER — OFFICE VISIT (OUTPATIENT)
Dept: FAMILY MEDICINE | Facility: CLINIC | Age: 70
End: 2025-01-02
Payer: COMMERCIAL

## 2025-01-02 VITALS
HEART RATE: 78 BPM | BODY MASS INDEX: 32.88 KG/M2 | RESPIRATION RATE: 15 BRPM | OXYGEN SATURATION: 97 % | HEIGHT: 69 IN | WEIGHT: 222 LBS | DIASTOLIC BLOOD PRESSURE: 90 MMHG | SYSTOLIC BLOOD PRESSURE: 148 MMHG | TEMPERATURE: 98 F

## 2025-01-02 DIAGNOSIS — R93.1 ELEVATED CORONARY ARTERY CALCIUM SCORE: ICD-10-CM

## 2025-01-02 DIAGNOSIS — Z12.5 PROSTATE CANCER SCREENING: ICD-10-CM

## 2025-01-02 DIAGNOSIS — Z00.00 ANNUAL PHYSICAL EXAM: ICD-10-CM

## 2025-01-02 DIAGNOSIS — Z01.818 PREOPERATIVE CLEARANCE: Primary | ICD-10-CM

## 2025-01-02 DIAGNOSIS — E11.9 TYPE 2 DIABETES MELLITUS WITHOUT COMPLICATION, WITHOUT LONG-TERM CURRENT USE OF INSULIN: ICD-10-CM

## 2025-01-02 DIAGNOSIS — N40.1 BENIGN PROSTATIC HYPERPLASIA WITH LOWER URINARY TRACT SYMPTOMS, SYMPTOM DETAILS UNSPECIFIED: ICD-10-CM

## 2025-01-02 DIAGNOSIS — I10 PRIMARY HYPERTENSION: ICD-10-CM

## 2025-01-02 DIAGNOSIS — E78.2 MIXED HYPERLIPIDEMIA: ICD-10-CM

## 2025-01-02 RX ORDER — AMLODIPINE BESYLATE 5 MG/1
5 TABLET ORAL DAILY
Qty: 90 TABLET | Refills: 3 | Status: SHIPPED | OUTPATIENT
Start: 2025-01-02 | End: 2026-01-02

## 2025-01-02 RX ORDER — TAMSULOSIN HYDROCHLORIDE 0.4 MG/1
CAPSULE ORAL
COMMUNITY
Start: 2024-12-30

## 2025-01-02 RX ORDER — METFORMIN HYDROCHLORIDE 500 MG/1
500 TABLET, EXTENDED RELEASE ORAL
Qty: 90 TABLET | Refills: 3 | Status: SHIPPED | OUTPATIENT
Start: 2025-01-02 | End: 2026-01-02

## 2025-01-02 RX ORDER — ROSUVASTATIN CALCIUM 10 MG/1
10 TABLET, COATED ORAL DAILY
Qty: 90 TABLET | Refills: 3 | Status: SHIPPED | OUTPATIENT
Start: 2025-01-02 | End: 2026-01-02

## 2025-01-02 RX ORDER — ASPIRIN 81 MG/1
81 TABLET ORAL DAILY
Qty: 90 TABLET | Refills: 3 | Status: SHIPPED | OUTPATIENT
Start: 2025-01-02 | End: 2026-01-02

## 2025-01-02 NOTE — ASSESSMENT & PLAN NOTE
Hemoglobin A1c up to 6.5.  Patient again admittedly is not following any form a diabetic diet.  Again strongly encouraged lifestyle modification.  Will start metformin  mg daily.  Follow-up in clinic in 3 months with labs prior.  Plan to complete foot exam and in office retina screening at next visit as well as collect urine for microalbumin at next visit.

## 2025-01-02 NOTE — PROGRESS NOTES
Subjective:       Patient ID: Souleymane Santiago Sr. is a 69 y.o. male.    Chief Complaint: Follow-up (Sx clearance)      HPI   This is a 69-year-old  male who presents to the clinic today for a three-month follow-up for prediabetes, hyperlipidemia, elevated blood pressure.  Reports also needs surgery clearance for an upcoming procedure with Urology.  Reports hesitant to complete procedure now because he was going for the procedure because he could not urinate well but since he started taking his Flomax regularly again he is having no issues.  Review of Systems  Comprehensive review of systems negative except as stated in HPI    The patient's Health Maintenance was reviewed and the following appears to be due:   There are no preventive care reminders to display for this patient.    Past Medical History:  Past Medical History:   Diagnosis Date    Enlarged prostate      Past Surgical History:   Procedure Laterality Date    INGUINAL HERNIA REPAIR  10/10/2023    SUPRAPUBIC PROSTATECTOMY  02/15/2023     Review of patient's allergies indicates:  No Known Allergies  Current Outpatient Medications on File Prior to Visit   Medication Sig Dispense Refill    tamsulosin (FLOMAX) 0.4 mg Cap Take by mouth.      [DISCONTINUED] aspirin (ECOTRIN) 81 MG EC tablet Take 1 tablet (81 mg total) by mouth once daily. 90 tablet 3    [DISCONTINUED] ketoconazole (NIZORAL) 2 % cream Apply topically once daily. (Patient not taking: Reported on 2025) 60 g 2    [DISCONTINUED] rosuvastatin (CRESTOR) 10 MG tablet Take 1 tablet (10 mg total) by mouth once daily. (Patient not taking: Reported on 2025) 90 tablet 3     No current facility-administered medications on file prior to visit.     Social History     Socioeconomic History    Marital status:    Tobacco Use    Smoking status: Former     Current packs/day: 0.00     Types: Cigarettes     Quit date:      Years since quittin.0    Smokeless tobacco: Never  "  Substance and Sexual Activity    Alcohol use: Not Currently    Drug use: Never    Sexual activity: Yes     Social Drivers of Health     Financial Resource Strain: Low Risk  (3/26/2024)    Overall Financial Resource Strain (CARDIA)     Difficulty of Paying Living Expenses: Not hard at all   Food Insecurity: No Food Insecurity (3/26/2024)    Hunger Vital Sign     Worried About Running Out of Food in the Last Year: Never true     Ran Out of Food in the Last Year: Never true   Transportation Needs: No Transportation Needs (3/26/2024)    PRAPARE - Transportation     Lack of Transportation (Medical): No     Lack of Transportation (Non-Medical): No   Physical Activity: Sufficiently Active (3/26/2024)    Exercise Vital Sign     Days of Exercise per Week: 7 days     Minutes of Exercise per Session: 60 min   Stress: No Stress Concern Present (3/26/2024)    [employer] - Occupational Stress Questionnaire     Feeling of Stress : Not at all   Housing Stability: Unknown (3/26/2024)    Housing Stability Vital Sign     Unable to Pay for Housing in the Last Year: No     Unstable Housing in the Last Year: No     Family History   Problem Relation Name Age of Onset    Heart disease Mother      Heart disease Father      Diabetes Sister      Diabetes Brother         Objective:       BP (!) 148/90   Pulse 78   Temp 98 °F (36.7 °C) (Oral)   Resp 15   Ht 5' 9" (1.753 m)   Wt 100.7 kg (222 lb)   SpO2 97%   BMI 32.78 kg/m²      Physical Exam  Vitals and nursing note reviewed.   Constitutional:       Appearance: Normal appearance. He is obese.   HENT:      Head: Normocephalic and atraumatic.      Right Ear: Tympanic membrane, ear canal and external ear normal.      Left Ear: Tympanic membrane, ear canal and external ear normal.      Nose: Nose normal.      Mouth/Throat:      Mouth: Mucous membranes are moist.      Pharynx: Oropharynx is clear.   Eyes:      Extraocular Movements: Extraocular movements " intact.      Conjunctiva/sclera: Conjunctivae normal.      Pupils: Pupils are equal, round, and reactive to light.   Cardiovascular:      Rate and Rhythm: Normal rate and regular rhythm.      Pulses: Normal pulses.      Heart sounds: Normal heart sounds.   Pulmonary:      Effort: Pulmonary effort is normal.      Breath sounds: Normal breath sounds.   Abdominal:      General: Abdomen is flat. Bowel sounds are normal.      Palpations: Abdomen is soft.   Musculoskeletal:         General: Normal range of motion.      Cervical back: Normal range of motion and neck supple.   Skin:     General: Skin is warm and dry.   Neurological:      General: No focal deficit present.      Mental Status: He is alert and oriented to person, place, and time.   Psychiatric:         Mood and Affect: Mood normal.         Behavior: Behavior normal.         Thought Content: Thought content normal.         Judgment: Judgment normal.         Labs  Lab Visit on 12/28/2024   Component Date Value Ref Range Status    Sodium 12/28/2024 138  136 - 145 mmol/L Final    Potassium 12/28/2024 4.0  3.5 - 5.1 mmol/L Final    Chloride 12/28/2024 104  98 - 107 mmol/L Final    CO2 12/28/2024 26  23 - 31 mmol/L Final    Glucose 12/28/2024 171 (H)  82 - 115 mg/dL Final    Blood Urea Nitrogen 12/28/2024 14.1  8.4 - 25.7 mg/dL Final    Creatinine 12/28/2024 1.11  0.72 - 1.25 mg/dL Final    Calcium 12/28/2024 9.0  8.8 - 10.0 mg/dL Final    Protein Total 12/28/2024 7.2  5.8 - 7.6 gm/dL Final    Albumin 12/28/2024 3.7  3.4 - 4.8 g/dL Final    Globulin 12/28/2024 3.5  2.4 - 3.5 gm/dL Final    Albumin/Globulin Ratio 12/28/2024 1.1  1.1 - 2.0 ratio Final    Bilirubin Total 12/28/2024 0.7  <=1.5 mg/dL Final    ALP 12/28/2024 66  40 - 150 unit/L Final    ALT 12/28/2024 49  0 - 55 unit/L Final    AST 12/28/2024 36 (H)  5 - 34 unit/L Final    eGFR 12/28/2024 >60  mL/min/1.73/m2 Final    Anion Gap 12/28/2024 8.0  mEq/L Final    BUN/Creatinine Ratio 12/28/2024 13   Final     WBC 12/28/2024 7.94  4.50 - 11.50 x10(3)/mcL Final    RBC 12/28/2024 5.09  4.70 - 6.10 x10(6)/mcL Final    Hgb 12/28/2024 15.2  14.0 - 18.0 g/dL Final    Hct 12/28/2024 46.1  42.0 - 52.0 % Final    MCV 12/28/2024 90.6  80.0 - 94.0 fL Final    MCH 12/28/2024 29.9  27.0 - 31.0 pg Final    MCHC 12/28/2024 33.0  33.0 - 36.0 g/dL Final    RDW 12/28/2024 12.8  11.5 - 17.0 % Final    Platelet 12/28/2024 225  130 - 400 x10(3)/mcL Final    MPV 12/28/2024 8.6  7.4 - 10.4 fL Final    Neut % 12/28/2024 51.7  % Final    Lymph % 12/28/2024 41.2  % Final    Mono % 12/28/2024 4.5  % Final    Eos % 12/28/2024 2.0  % Final    Basophil % 12/28/2024 0.5  % Final    Lymph # 12/28/2024 3.27  0.6 - 4.6 x10(3)/mcL Final    Neut # 12/28/2024 4.10  2.1 - 9.2 x10(3)/mcL Final    Mono # 12/28/2024 0.36  0.1 - 1.3 x10(3)/mcL Final    Eos # 12/28/2024 0.16  0 - 0.9 x10(3)/mcL Final    Baso # 12/28/2024 0.04  <=0.2 x10(3)/mcL Final    IG# 12/28/2024 0.01  0 - 0.04 x10(3)/mcL Final    IG% 12/28/2024 0.1  % Final   Lab Visit on 10/01/2024   Component Date Value Ref Range Status    Hemoglobin A1c 10/01/2024 6.4  <=7.0 % Final    Estimated Average Glucose 10/01/2024 137.0  mg/dL Final    Sodium 10/01/2024 143  136 - 145 mmol/L Final    Potassium 10/01/2024 4.2  3.5 - 5.1 mmol/L Final    Chloride 10/01/2024 109 (H)  98 - 107 mmol/L Final    CO2 10/01/2024 27  23 - 31 mmol/L Final    Glucose 10/01/2024 100  82 - 115 mg/dL Final    Blood Urea Nitrogen 10/01/2024 12.4  8.4 - 25.7 mg/dL Final    Creatinine 10/01/2024 1.05  0.73 - 1.18 mg/dL Final    Calcium 10/01/2024 9.2  8.8 - 10.0 mg/dL Final    Protein Total 10/01/2024 6.7  5.8 - 7.6 gm/dL Final    Albumin 10/01/2024 3.8  3.4 - 4.8 g/dL Final    Globulin 10/01/2024 2.9  2.4 - 3.5 gm/dL Final    Albumin/Globulin Ratio 10/01/2024 1.3  1.1 - 2.0 ratio Final    Bilirubin Total 10/01/2024 0.8  <=1.5 mg/dL Final    ALP 10/01/2024 65  40 - 150 unit/L Final    ALT 10/01/2024 43  0 - 55 unit/L Final    AST  10/01/2024 33  5 - 34 unit/L Final    eGFR 10/01/2024 >60  mL/min/1.73/m2 Final    Anion Gap 10/01/2024 7.0  mEq/L Final    BUN/Creatinine Ratio 10/01/2024 12   Final    Cholesterol Total 10/01/2024 186  <=200 mg/dL Final    HDL Cholesterol 10/01/2024 45  35 - 60 mg/dL Final    Triglyceride 10/01/2024 101  34 - 140 mg/dL Final    Cholesterol/HDL Ratio 10/01/2024 4  0 - 5 Final    Very Low Density Lipoprotein 10/01/2024 20   Final    LDL Cholesterol 10/01/2024 121.00  50.00 - 140.00 mg/dL Final     X-Ray Chest PA And Lateral  Narrative: EXAMINATION:  XR CHEST PA AND LATERAL    CLINICAL HISTORY:  Encounter for other preprocedural examination    TECHNIQUE:  PA and lateral views of the chest were performed.    COMPARISON:  01/19/2023    FINDINGS:  The lungs are clear, with normal appearance of pulmonary vasculature and no pleural effusion or pneumothorax.    The cardiac silhouette is normal in size. The hilar and mediastinal contours are unremarkable.    Bones are intact.  Impression: No acute abnormality.    Electronically signed by: Gallo Kaminski MD  Date:    12/28/2024  Time:    15:14     Assessment and Plan       ICD-10-CM ICD-9-CM   1. Preoperative clearance  Z01.818 V72.84   2. Mixed hyperlipidemia  E78.2 272.2   3. Benign prostatic hyperplasia with lower urinary tract symptoms, symptom details unspecified  N40.1 600.01   4. Elevated coronary artery calcium score  R93.1 414.00   5. Type 2 diabetes mellitus without complication, without long-term current use of insulin  E11.9 250.00   6. Primary hypertension  I10 401.9   7. Annual physical exam  Z00.00 V70.0   8. Prostate cancer screening  Z12.5 V76.44        1. Preoperative clearance  Comments:  Cardiology records reviewed, low risk stress test.  Chest x-ray and preop labs good.  Cleared for surgery.    2. Mixed hyperlipidemia  Overview:  10/01/2024 - start rosuvastatin 10 mg daily    Assessment & Plan:  Lipid panel in 3 months.  Continue rosuvastatin 10 mg  daily.    Orders:  -     rosuvastatin (CRESTOR) 10 MG tablet; Take 1 tablet (10 mg total) by mouth once daily.  Dispense: 90 tablet; Refill: 3  -     aspirin (ECOTRIN) 81 MG EC tablet; Take 1 tablet (81 mg total) by mouth once daily.  Dispense: 90 tablet; Refill: 3    3. Benign prostatic hyperplasia with lower urinary tract symptoms, symptom details unspecified  Overview:  Managed by Dr. Shoaib Valentine and Dr. Bravo    02/15/2023 - robotic prostatectomy per Dr. Bravo    Assessment & Plan:  Cleared for cystoscopy with urethral dilation with general anesthesia per Dr. Bravo.       4. Elevated coronary artery calcium score  Overview:  09/24/2024 - CACS 275 - Definite, at least moderate burden of coronary plaque; risk of CAD - mild CAD highly likely, significant narrowing(s) possible. Refer to Dr Montes De Oca    12/03/2024 - Nuclear stress test @ [facility] - normal perfusion study, no perfusion defects noted. There is no evidence of ischemia.       Assessment & Plan:  Normal nuclear stress test.  Continue rosuvastatin.  Cleared to proceed with general anesthesia for Urology surgery.    Orders:  -     aspirin (ECOTRIN) 81 MG EC tablet; Take 1 tablet (81 mg total) by mouth once daily.  Dispense: 90 tablet; Refill: 3    5. Type 2 diabetes mellitus without complication, without long-term current use of insulin  Overview:  03/26/2024 - Hgb A1c 5.9  10/01/2024 - Hgb A1c 6.4  01/02/2025 - Hgb A1c 6.5, start metformin  mg daily    Assessment & Plan:  Hemoglobin A1c up to 6.5.  Patient again admittedly is not following any form a diabetic diet.  Again strongly encouraged lifestyle modification.  Will start metformin  mg daily.  Follow-up in clinic in 3 months with labs prior.  Plan to complete foot exam and in office retina screening at next visit as well as collect urine for microalbumin at next visit.    Orders:  -     Hemoglobin A1C, POCT  -     metFORMIN (GLUCOPHAGE-XR) 500 MG ER 24hr tablet; Take 1 tablet (500 mg total)  by mouth daily with breakfast.  Dispense: 90 tablet; Refill: 3  -     Hemoglobin A1C; Future; Expected date: 04/02/2025  -     Microalbumin/Creatinine Ratio, Urine; Future; Expected date: 04/02/2025    6. Primary hypertension  Overview:  01/02/2025 - amlodipine 5 mg daily    Assessment & Plan:  Pressure elevated again twice in clinic today.  Will start him on amlodipine 5 mg daily.  Follow-up 3 months.    Orders:  -     amLODIPine (NORVASC) 5 MG tablet; Take 1 tablet (5 mg total) by mouth once daily.  Dispense: 90 tablet; Refill: 3    7. Annual physical exam  -     CBC Auto Differential; Future; Expected date: 04/02/2025  -     Comprehensive Metabolic Panel; Future; Expected date: 04/02/2025  -     Lipid Panel; Future; Expected date: 04/02/2025  -     TSH; Future; Expected date: 04/02/2025    8. Prostate cancer screening  -     PSA, Screening; Future; Expected date: 04/02/2025           Follow up in 3 months (on 4/1/2025) for Annual.

## 2025-01-02 NOTE — ASSESSMENT & PLAN NOTE
Normal nuclear stress test.  Continue rosuvastatin.  Cleared to proceed with general anesthesia for Urology surgery.

## 2025-01-02 NOTE — ASSESSMENT & PLAN NOTE
Pressure elevated again twice in clinic today.  Will start him on amlodipine 5 mg daily.  Follow-up 3 months.

## 2025-01-02 NOTE — LETTER
AUTHORIZATION FOR RELEASE OF   CONFIDENTIAL INFORMATION    Dear Sheryl Marks,    We are seeing Souleymane Santiago Sr., date of birth 1955, in the clinic at [facility]. Nneka Hernandez FNP is the patient's PCP. Souleymane Valeroet Sergei has an outstanding lab/procedure at the time we reviewed his chart. In order to help keep his health information updated, he has authorized us to request the following medical record(s):        (  )  MAMMOGRAM                                      (  )  COLONOSCOPY      (  )  PAP SMEAR                                          (  )  OUTSIDE LAB RESULTS     (  )  DEXA SCAN                                          (  )  EYE EXAM            (  )  FOOT EXAM                                          (  )  ENTIRE RECORD     ( X )  ALL OVN since 10/2024 - present for sx clearance. - Dr. Montes De Oca/ Dr. Pride         Please fax records to [facility], [place], Kathryn F., FNP, [phone]      If you have any questions, please contact [phone]            Patient Name: Souleymane Santiago   : 1955  Patient Phone #: [phone]

## 2025-01-08 ENCOUNTER — TELEPHONE (OUTPATIENT)
Dept: PHARMACY | Facility: CLINIC | Age: 70
End: 2025-01-08
Payer: COMMERCIAL

## 2025-01-08 NOTE — TELEPHONE ENCOUNTER
[facility]/Population Health Chart Review & Patient Outreach Details For Medication Adherence Project    Reason for Outreach Encounter: 3rd Party payor non-compliance report ([employer], [employer], [employer], etc)  2.  Patient Outreach Method: Reviewed patient chart   3.   Medication in question:    Hyperlipidemia Medications               rosuvastatin (CRESTOR) 10 MG tablet Take 1 tablet (10 mg total) by mouth once daily.                  rosuvastatin  last filled  1/2/25 for 90 day supply    4.  Reviewed and or Updates Made To: Patient Chart  5. Outreach Outcomes and/or actions taken: Patient filled medication and is on track to be adherent  Additional Notes:

## 2025-03-25 ENCOUNTER — TELEPHONE (OUTPATIENT)
Dept: FAMILY MEDICINE | Facility: CLINIC | Age: 70
End: 2025-03-25
Payer: COMMERCIAL

## 2025-03-25 NOTE — TELEPHONE ENCOUNTER
Are there any outstanding tasks in patient's chart?    labs  2. Do we have outstanding/pending referrals?    n  3. Has the patient been seen in an ER, Urgent Care, or admitted since last visit?    n  4. Has patient seen any other health care providers since last visit?  [facility]    5.  Has patient had any blood work or x-rays done since last visit?   Will complete labs before visit

## 2025-03-31 ENCOUNTER — LAB VISIT (OUTPATIENT)
Dept: LAB | Facility: HOSPITAL | Age: 70
End: 2025-03-31
Attending: NURSE PRACTITIONER
Payer: COMMERCIAL

## 2025-03-31 DIAGNOSIS — E11.9 TYPE 2 DIABETES MELLITUS WITHOUT COMPLICATION, WITHOUT LONG-TERM CURRENT USE OF INSULIN: ICD-10-CM

## 2025-03-31 DIAGNOSIS — Z00.00 ANNUAL PHYSICAL EXAM: ICD-10-CM

## 2025-03-31 DIAGNOSIS — Z12.5 PROSTATE CANCER SCREENING: ICD-10-CM

## 2025-03-31 LAB
ALBUMIN SERPL-MCNC: 3.9 G/DL (ref 3.4–4.8)
ALBUMIN/GLOB SERPL: 1.2 RATIO (ref 1.1–2)
ALP SERPL-CCNC: 66 UNIT/L (ref 40–150)
ALT SERPL-CCNC: 26 UNIT/L (ref 0–55)
ANION GAP SERPL CALC-SCNC: 7 MEQ/L
AST SERPL-CCNC: 26 UNIT/L (ref 11–45)
BASOPHILS # BLD AUTO: 0.04 X10(3)/MCL
BASOPHILS NFR BLD AUTO: 0.5 %
BILIRUB SERPL-MCNC: 0.7 MG/DL
BUN SERPL-MCNC: 12.9 MG/DL (ref 8.4–25.7)
CALCIUM SERPL-MCNC: 9 MG/DL (ref 8.8–10)
CHLORIDE SERPL-SCNC: 108 MMOL/L (ref 98–107)
CHOLEST SERPL-MCNC: 156 MG/DL
CHOLEST/HDLC SERPL: 3 {RATIO} (ref 0–5)
CO2 SERPL-SCNC: 27 MMOL/L (ref 23–31)
CREAT SERPL-MCNC: 0.96 MG/DL (ref 0.72–1.25)
CREAT UR-MCNC: 143.6 MG/DL (ref 63–166)
CREAT/UREA NIT SERPL: 13
EOSINOPHIL # BLD AUTO: 0.23 X10(3)/MCL (ref 0–0.9)
EOSINOPHIL NFR BLD AUTO: 3 %
ERYTHROCYTE [DISTWIDTH] IN BLOOD BY AUTOMATED COUNT: 13.1 % (ref 11.5–17)
EST. AVERAGE GLUCOSE BLD GHB EST-MCNC: 131.2 MG/DL
GFR SERPLBLD CREATININE-BSD FMLA CKD-EPI: >60 ML/MIN/1.73/M2
GLOBULIN SER-MCNC: 3.2 GM/DL (ref 2.4–3.5)
GLUCOSE SERPL-MCNC: 99 MG/DL (ref 82–115)
HBA1C MFR BLD: 6.2 %
HCT VFR BLD AUTO: 44.2 % (ref 42–52)
HDLC SERPL-MCNC: 48 MG/DL (ref 35–60)
HGB BLD-MCNC: 14.4 G/DL (ref 14–18)
IMM GRANULOCYTES # BLD AUTO: 0.01 X10(3)/MCL (ref 0–0.04)
IMM GRANULOCYTES NFR BLD AUTO: 0.1 %
LDLC SERPL CALC-MCNC: 82 MG/DL (ref 50–140)
LYMPHOCYTES # BLD AUTO: 2.93 X10(3)/MCL (ref 0.6–4.6)
LYMPHOCYTES NFR BLD AUTO: 37.9 %
MCH RBC QN AUTO: 29.9 PG (ref 27–31)
MCHC RBC AUTO-ENTMCNC: 32.6 G/DL (ref 33–36)
MCV RBC AUTO: 91.7 FL (ref 80–94)
MICROALBUMIN UR-MCNC: 7.1 UG/ML
MICROALBUMIN/CREAT RATIO PNL UR: 4.9 MG/GM CR (ref 0–30)
MONOCYTES # BLD AUTO: 0.5 X10(3)/MCL (ref 0.1–1.3)
MONOCYTES NFR BLD AUTO: 6.5 %
NEUTROPHILS # BLD AUTO: 4.03 X10(3)/MCL (ref 2.1–9.2)
NEUTROPHILS NFR BLD AUTO: 52 %
PLATELET # BLD AUTO: 218 X10(3)/MCL (ref 130–400)
PMV BLD AUTO: 8.4 FL (ref 7.4–10.4)
POTASSIUM SERPL-SCNC: 4.6 MMOL/L (ref 3.5–5.1)
PROT SERPL-MCNC: 7.1 GM/DL (ref 5.8–7.6)
PSA SERPL-MCNC: 0.68 NG/ML
RBC # BLD AUTO: 4.82 X10(6)/MCL (ref 4.7–6.1)
SODIUM SERPL-SCNC: 142 MMOL/L (ref 136–145)
TRIGL SERPL-MCNC: 131 MG/DL (ref 34–140)
TSH SERPL-ACNC: 1.71 UIU/ML (ref 0.35–4.94)
VLDLC SERPL CALC-MCNC: 26 MG/DL
WBC # BLD AUTO: 7.74 X10(3)/MCL (ref 4.5–11.5)

## 2025-03-31 PROCEDURE — 85025 COMPLETE CBC W/AUTO DIFF WBC: CPT

## 2025-03-31 PROCEDURE — 83036 HEMOGLOBIN GLYCOSYLATED A1C: CPT

## 2025-03-31 PROCEDURE — 80061 LIPID PANEL: CPT

## 2025-03-31 PROCEDURE — 82570 ASSAY OF URINE CREATININE: CPT

## 2025-03-31 PROCEDURE — 36415 COLL VENOUS BLD VENIPUNCTURE: CPT

## 2025-03-31 PROCEDURE — 84443 ASSAY THYROID STIM HORMONE: CPT

## 2025-03-31 PROCEDURE — 84153 ASSAY OF PSA TOTAL: CPT

## 2025-03-31 PROCEDURE — 80053 COMPREHEN METABOLIC PANEL: CPT

## 2025-04-01 ENCOUNTER — OFFICE VISIT (OUTPATIENT)
Dept: FAMILY MEDICINE | Facility: CLINIC | Age: 70
End: 2025-04-01
Payer: COMMERCIAL

## 2025-04-01 VITALS
OXYGEN SATURATION: 98 % | HEART RATE: 60 BPM | DIASTOLIC BLOOD PRESSURE: 76 MMHG | WEIGHT: 211 LBS | SYSTOLIC BLOOD PRESSURE: 122 MMHG | BODY MASS INDEX: 31.25 KG/M2 | HEIGHT: 69 IN | RESPIRATION RATE: 16 BRPM

## 2025-04-01 DIAGNOSIS — I10 PRIMARY HYPERTENSION: ICD-10-CM

## 2025-04-01 DIAGNOSIS — E11.9 TYPE 2 DIABETES MELLITUS WITHOUT COMPLICATION, WITHOUT LONG-TERM CURRENT USE OF INSULIN: ICD-10-CM

## 2025-04-01 DIAGNOSIS — N35.919 STRICTURE OF MALE URETHRA, UNSPECIFIED STRICTURE TYPE: ICD-10-CM

## 2025-04-01 DIAGNOSIS — N40.1 BENIGN PROSTATIC HYPERPLASIA WITH LOWER URINARY TRACT SYMPTOMS, SYMPTOM DETAILS UNSPECIFIED: ICD-10-CM

## 2025-04-01 DIAGNOSIS — B35.1 ONYCHOMYCOSIS: ICD-10-CM

## 2025-04-01 DIAGNOSIS — Z00.00 ANNUAL PHYSICAL EXAM: Primary | ICD-10-CM

## 2025-04-01 DIAGNOSIS — E78.2 MIXED HYPERLIPIDEMIA: ICD-10-CM

## 2025-04-01 DIAGNOSIS — Z12.5 PROSTATE CANCER SCREENING: ICD-10-CM

## 2025-04-01 PROBLEM — R03.0 ELEVATED BLOOD PRESSURE READING: Status: RESOLVED | Noted: 2023-01-30 | Resolved: 2025-04-01

## 2025-04-01 PROCEDURE — 1159F MED LIST DOCD IN RCRD: CPT | Mod: CPTII,,, | Performed by: NURSE PRACTITIONER

## 2025-04-01 PROCEDURE — 3074F SYST BP LT 130 MM HG: CPT | Mod: CPTII,,, | Performed by: NURSE PRACTITIONER

## 2025-04-01 PROCEDURE — 1126F AMNT PAIN NOTED NONE PRSNT: CPT | Mod: CPTII,,, | Performed by: NURSE PRACTITIONER

## 2025-04-01 PROCEDURE — 1160F RVW MEDS BY RX/DR IN RCRD: CPT | Mod: CPTII,,, | Performed by: NURSE PRACTITIONER

## 2025-04-01 PROCEDURE — 3044F HG A1C LEVEL LT 7.0%: CPT | Mod: CPTII,,, | Performed by: NURSE PRACTITIONER

## 2025-04-01 PROCEDURE — 3061F NEG MICROALBUMINURIA REV: CPT | Mod: CPTII,,, | Performed by: NURSE PRACTITIONER

## 2025-04-01 PROCEDURE — 99397 PER PM REEVAL EST PAT 65+ YR: CPT | Mod: ,,, | Performed by: NURSE PRACTITIONER

## 2025-04-01 PROCEDURE — 3078F DIAST BP <80 MM HG: CPT | Mod: CPTII,,, | Performed by: NURSE PRACTITIONER

## 2025-04-01 PROCEDURE — 3066F NEPHROPATHY DOC TX: CPT | Mod: CPTII,,, | Performed by: NURSE PRACTITIONER

## 2025-04-01 PROCEDURE — 1101F PT FALLS ASSESS-DOCD LE1/YR: CPT | Mod: CPTII,,, | Performed by: NURSE PRACTITIONER

## 2025-04-01 PROCEDURE — 3288F FALL RISK ASSESSMENT DOCD: CPT | Mod: CPTII,,, | Performed by: NURSE PRACTITIONER

## 2025-04-01 PROCEDURE — 3008F BODY MASS INDEX DOCD: CPT | Mod: CPTII,,, | Performed by: NURSE PRACTITIONER

## 2025-04-01 RX ORDER — METFORMIN HYDROCHLORIDE 500 MG/1
1000 TABLET, EXTENDED RELEASE ORAL
Qty: 180 TABLET | Refills: 3 | Status: SHIPPED | OUTPATIENT
Start: 2025-04-01 | End: 2026-04-01

## 2025-04-01 RX ORDER — AMLODIPINE BESYLATE 5 MG/1
5 TABLET ORAL DAILY
Qty: 90 TABLET | Refills: 3 | Status: SHIPPED | OUTPATIENT
Start: 2025-04-01 | End: 2026-04-01

## 2025-04-01 NOTE — ASSESSMENT & PLAN NOTE
- Provided information about toenail fungus and treatment options.    - patient declined treatment

## 2025-04-01 NOTE — ASSESSMENT & PLAN NOTE
- Explained importance of annual diabetic eye exams and foot checks.  - Increased metformin dose from 500 mg to 1000 mg daily, taking 2 tablets in the morning instead of 1.  - Referred to Dr[facility] Weller at [facility] for diabetic eye exam.  - Follow up in 6 months.

## 2025-04-01 NOTE — PROGRESS NOTES
"Subjective:       Patient ID: Souleymane Santiago Sr. is a 70 y.o. male.    Chief Complaint: Annual Exam      History of Present Illness    CHIEF COMPLAINT:  Patient presents today for wellness visit and lab result review    DIABETES:  He reports improvement in diabetes control since starting metformin with A1C decreasing from 6.5 to 6.2. He takes the medication every morning before work and denies any side effects.    GENITOURINARY:  He has a history of prostate removal and underwent cystoscopy urethral dilation with temporary improvement in urinary symptoms lasting approximately one month before returning to baseline. He reports difficulty initiating urination with poor urinary flow and slow stream.    OCULAR:  He reports night vision problems and floaters, describing a small, cotton-like object in his visual field while lying down. His last eye doctor visit was a while ago.    INTEGUMENTARY:  He has longstanding toenail fungus.    LABS:  Total cholesterol 156, LDL 82. PSA 0.68. Thyroid function normal.        Review of Systems  Comprehensive review of systems negative except as stated in HPI    The patient's Health Maintenance was reviewed and the following appears to be due:   Health Maintenance Due   Topic Date Due    Foot Exam  Never done    Diabetic Eye Exam  Never done       Past Medical History:  Past Medical History:   Diagnosis Date    Enlarged prostate      Past Surgical History:   Procedure Laterality Date    CYSTOSCOPY WITH URETHRAL DILATION  01/09/2025    INGUINAL HERNIA REPAIR  10/10/2023    SUPRAPUBIC PROSTATECTOMY  02/15/2023     Review of patient's allergies indicates:  No Known Allergies  Medications Ordered Prior to Encounter[1]  Social History[2]  Family History   Problem Relation Name Age of Onset    Heart disease Mother      Heart disease Father      Diabetes Sister      Diabetes Brother         Objective:       /76 (BP Location: Left arm)   Pulse 60   Resp 16   Ht 5' 9" (1.753 m)   Wt " 95.7 kg (211 lb)   SpO2 98%   BMI 31.16 kg/m²      Physical Exam  Vitals and nursing note reviewed.   Constitutional:       Appearance: Normal appearance. He is obese.   HENT:      Head: Normocephalic and atraumatic.      Right Ear: Tympanic membrane, ear canal and external ear normal.      Left Ear: Tympanic membrane, ear canal and external ear normal.      Nose: Nose normal.      Mouth/Throat:      Mouth: Mucous membranes are moist.      Pharynx: Oropharynx is clear.   Eyes:      Extraocular Movements: Extraocular movements intact.      Conjunctiva/sclera: Conjunctivae normal.      Pupils: Pupils are equal, round, and reactive to light.   Cardiovascular:      Rate and Rhythm: Normal rate and regular rhythm.      Pulses: Normal pulses.           Dorsalis pedis pulses are 2+ on the right side and 2+ on the left side.        Posterior tibial pulses are 2+ on the right side and 2+ on the left side.      Heart sounds: Normal heart sounds.   Pulmonary:      Effort: Pulmonary effort is normal.      Breath sounds: Normal breath sounds.   Abdominal:      General: Abdomen is flat. Bowel sounds are normal.      Palpations: Abdomen is soft.   Musculoskeletal:         General: Normal range of motion.      Cervical back: Normal range of motion.   Feet:      Right foot:      Protective Sensation: 5 sites tested.  5 sites sensed.      Skin integrity: Skin integrity normal.      Toenail Condition: Right toenails are normal.      Left foot:      Protective Sensation: 5 sites tested.  5 sites sensed.      Skin integrity: Skin integrity normal.      Toenail Condition: Fungal disease present.  Skin:     General: Skin is warm and dry.   Neurological:      General: No focal deficit present.      Mental Status: He is alert and oriented to person, place, and time.   Psychiatric:         Mood and Affect: Mood normal.         Behavior: Behavior normal.         Thought Content: Thought content normal.         Judgment: Judgment normal.          Labs  Lab Visit on 03/31/2025   Component Date Value Ref Range Status    Sodium 03/31/2025 142  136 - 145 mmol/L Final    Potassium 03/31/2025 4.6  3.5 - 5.1 mmol/L Final    Chloride 03/31/2025 108 (H)  98 - 107 mmol/L Final    CO2 03/31/2025 27  23 - 31 mmol/L Final    Glucose 03/31/2025 99  82 - 115 mg/dL Final    Blood Urea Nitrogen 03/31/2025 12.9  8.4 - 25.7 mg/dL Final    Creatinine 03/31/2025 0.96  0.72 - 1.25 mg/dL Final    Calcium 03/31/2025 9.0  8.8 - 10.0 mg/dL Final    Protein Total 03/31/2025 7.1  5.8 - 7.6 gm/dL Final    Albumin 03/31/2025 3.9  3.4 - 4.8 g/dL Final    Globulin 03/31/2025 3.2  2.4 - 3.5 gm/dL Final    Albumin/Globulin Ratio 03/31/2025 1.2  1.1 - 2.0 ratio Final    Bilirubin Total 03/31/2025 0.7  <=1.5 mg/dL Final    ALP 03/31/2025 66  40 - 150 unit/L Final    ALT 03/31/2025 26  0 - 55 unit/L Final    AST 03/31/2025 26  11 - 45 unit/L Final    eGFR 03/31/2025 >60  mL/min/1.73/m2 Final    Estimated GFR calculated using the CKD-EPI creatinine (2021) equation.    Anion Gap 03/31/2025 7.0  mEq/L Final    BUN/Creatinine Ratio 03/31/2025 13   Final    Cholesterol Total 03/31/2025 156  <=200 mg/dL Final    HDL Cholesterol 03/31/2025 48  35 - 60 mg/dL Final    Triglyceride 03/31/2025 131  34 - 140 mg/dL Final    Cholesterol/HDL Ratio 03/31/2025 3  0 - 5 Final    Very Low Density Lipoprotein 03/31/2025 26   Final    LDL Cholesterol 03/31/2025 82.00  50.00 - 140.00 mg/dL Final    LDL calculated using the Friedewald equation.    TSH 03/31/2025 1.713  0.350 - 4.940 uIU/mL Final    Hemoglobin A1c 03/31/2025 6.2  <=7.0 % Final    Estimated Average Glucose 03/31/2025 131.2  mg/dL Final    Prostate Specific Antigen 03/31/2025 0.68  <=4.00 ng/mL Final    Urine Microalbumin 03/31/2025 7.1  <=30.0 ug/mL Final    Urine Creatinine 03/31/2025 143.6  63.0 - 166.0 mg/dL Final    Microalbumin Creatinine Ratio 03/31/2025 4.9  0.0 - 30.0 mg/gm Cr Final    WBC 03/31/2025 7.74  4.50 - 11.50 x10(3)/mcL Final     RBC 03/31/2025 4.82  4.70 - 6.10 x10(6)/mcL Final    Hgb 03/31/2025 14.4  14.0 - 18.0 g/dL Final    Hct 03/31/2025 44.2  42.0 - 52.0 % Final    MCV 03/31/2025 91.7  80.0 - 94.0 fL Final    MCH 03/31/2025 29.9  27.0 - 31.0 pg Final    MCHC 03/31/2025 32.6 (L)  33.0 - 36.0 g/dL Final    RDW 03/31/2025 13.1  11.5 - 17.0 % Final    Platelet 03/31/2025 218  130 - 400 x10(3)/mcL Final    MPV 03/31/2025 8.4  7.4 - 10.4 fL Final    Neut % 03/31/2025 52.0  % Final    Lymph % 03/31/2025 37.9  % Final    Mono % 03/31/2025 6.5  % Final    Eos % 03/31/2025 3.0  % Final    Basophil % 03/31/2025 0.5  % Final    Imm Grans % 03/31/2025 0.1  % Final    Neut # 03/31/2025 4.03  2.1 - 9.2 x10(3)/mcL Final    Lymph # 03/31/2025 2.93  0.6 - 4.6 x10(3)/mcL Final    Mono # 03/31/2025 0.50  0.1 - 1.3 x10(3)/mcL Final    Eos # 03/31/2025 0.23  0 - 0.9 x10(3)/mcL Final    Baso # 03/31/2025 0.04  <=0.2 x10(3)/mcL Final    Imm Gran # 03/31/2025 0.01  0.00 - 0.04 x10(3)/mcL Final   Lab Visit on 12/28/2024   Component Date Value Ref Range Status    Sodium 12/28/2024 138  136 - 145 mmol/L Final    Potassium 12/28/2024 4.0  3.5 - 5.1 mmol/L Final    Chloride 12/28/2024 104  98 - 107 mmol/L Final    CO2 12/28/2024 26  23 - 31 mmol/L Final    Glucose 12/28/2024 171 (H)  82 - 115 mg/dL Final    Blood Urea Nitrogen 12/28/2024 14.1  8.4 - 25.7 mg/dL Final    Creatinine 12/28/2024 1.11  0.72 - 1.25 mg/dL Final    Calcium 12/28/2024 9.0  8.8 - 10.0 mg/dL Final    Protein Total 12/28/2024 7.2  5.8 - 7.6 gm/dL Final    Albumin 12/28/2024 3.7  3.4 - 4.8 g/dL Final    Globulin 12/28/2024 3.5  2.4 - 3.5 gm/dL Final    Albumin/Globulin Ratio 12/28/2024 1.1  1.1 - 2.0 ratio Final    Bilirubin Total 12/28/2024 0.7  <=1.5 mg/dL Final    ALP 12/28/2024 66  40 - 150 unit/L Final    ALT 12/28/2024 49  0 - 55 unit/L Final    AST 12/28/2024 36 (H)  5 - 34 unit/L Final    eGFR 12/28/2024 >60  mL/min/1.73/m2 Final    Anion Gap 12/28/2024 8.0  mEq/L Final     BUN/Creatinine Ratio 12/28/2024 13   Final    WBC 12/28/2024 7.94  4.50 - 11.50 x10(3)/mcL Final    RBC 12/28/2024 5.09  4.70 - 6.10 x10(6)/mcL Final    Hgb 12/28/2024 15.2  14.0 - 18.0 g/dL Final    Hct 12/28/2024 46.1  42.0 - 52.0 % Final    MCV 12/28/2024 90.6  80.0 - 94.0 fL Final    MCH 12/28/2024 29.9  27.0 - 31.0 pg Final    MCHC 12/28/2024 33.0  33.0 - 36.0 g/dL Final    RDW 12/28/2024 12.8  11.5 - 17.0 % Final    Platelet 12/28/2024 225  130 - 400 x10(3)/mcL Final    MPV 12/28/2024 8.6  7.4 - 10.4 fL Final    Neut % 12/28/2024 51.7  % Final    Lymph % 12/28/2024 41.2  % Final    Mono % 12/28/2024 4.5  % Final    Eos % 12/28/2024 2.0  % Final    Basophil % 12/28/2024 0.5  % Final    Lymph # 12/28/2024 3.27  0.6 - 4.6 x10(3)/mcL Final    Neut # 12/28/2024 4.10  2.1 - 9.2 x10(3)/mcL Final    Mono # 12/28/2024 0.36  0.1 - 1.3 x10(3)/mcL Final    Eos # 12/28/2024 0.16  0 - 0.9 x10(3)/mcL Final    Baso # 12/28/2024 0.04  <=0.2 x10(3)/mcL Final    Imm Gran # 12/28/2024 0.01  0 - 0.04 x10(3)/mcL Final    Imm Grans % 12/28/2024 0.1  % Final       Assessment and Plan     Assessment & Plan    E11.9 Type 2 diabetes mellitus without complication, without long-term current use of insulin  Z00.00 Annual physical exam  Z12.5 Prostate cancer screening  I10 Primary hypertension  E78.2 Mixed hyperlipidemia  N40.1 Benign prostatic hyperplasia with lower urinary tract symptoms, symptom details unspecified  B35.1 Onychomycosis  N35.919 Stricture of male urethra, unspecified stricture type    IMPRESSION:  - Increased metformin dose from 500 mg to 1000 mg daily due to improvement in A1C from 6.5 to 6.2, taking 2 tablets in the morning instead of 1, aiming for further reduction.  - Maintained current BP medication as BP has improved to 122/76.  - Considered urinary symptoms and history of urethral stricture, acknowledging limited success of previous urethral dilation.  - Evaluated overall health status, noting improvements in  diabetes management, BP control, and cholesterol levels.  - Cancelled retinal imaging in office.    2 DIABETES MELLITUS WITHOUT COMPLICATION, WITHOUT LONG-TERM CURRENT USE OF INSULIN:  - Explained importance of annual diabetic eye exams and foot checks.  - Increased metformin dose from 500 mg to 1000 mg daily, taking 2 tablets in the morning instead of 1.  - Referred to Dr. Weller at [facility] for diabetic eye exam.  - Follow up in 6 months.    PHYSICAL EXAM:  - Follow up in 6 months.    ONYCHOMYCOSIS:  - Provided information about toenail fungus and treatment options.    - patient declined treatment          1. Annual physical exam  Overview:  Annual exam yearly in March      2. Prostate cancer screening  Assessment & Plan:  PSA 0.68, repeat 1 year      3. Primary hypertension  Overview:  01/02/2025 - amlodipine 5 mg daily    Assessment & Plan:  Significantly improved, continue amlodipine 5 mg daily.    Orders:  -     Comprehensive Metabolic Panel; Future; Expected date: 10/01/2025  -     amLODIPine (NORVASC) 5 MG tablet; Take 1 tablet (5 mg total) by mouth once daily.  Dispense: 90 tablet; Refill: 3    4. Mixed hyperlipidemia  Overview:  10/01/2024 - start rosuvastatin 10 mg daily    Assessment & Plan:  Improved, total cholesterol 156, LDL 82.  Continue rosuvastatin 10 mg daily, follow-up 6 months.    Orders:  -     Comprehensive Metabolic Panel; Future; Expected date: 10/01/2025  -     Lipid Panel; Future; Expected date: 10/01/2025    5. Benign prostatic hyperplasia with lower urinary tract symptoms, symptom details unspecified  Overview:  Managed by Dr. Shoaib Valentine and Dr. Bravo    02/15/2023 - robotic prostatectomy per Dr. Bravo      6. Type 2 diabetes mellitus without complication, without long-term current use of insulin  Overview:  03/26/2024 - Hgb A1c 5.9  10/01/2024 - Hgb A1c 6.4  01/02/2025 - Hgb A1c 6.5, start metformin  mg daily  04/01/2025 - Hgb A1c 6.2, metformin XR 1000 mg  daily    Assessment & Plan:  - Explained importance of annual diabetic eye exams and foot checks.  - Increased metformin dose from 500 mg to 1000 mg daily, taking 2 tablets in the morning instead of 1.  - Referred to Dr. Weller at [facility] for diabetic eye exam.  - Follow up in 6 months.    Orders:  -     Cancel: Diabetic Eye Screening Photo  -     Comprehensive Metabolic Panel; Future; Expected date: 10/01/2025  -     Hemoglobin A1C; Future; Expected date: 10/01/2025  -     metFORMIN (GLUCOPHAGE-XR) 500 MG ER 24hr tablet; Take 2 tablets (1,000 mg total) by mouth daily with breakfast.  Dispense: 180 tablet; Refill: 3  -     Ambulatory referral/consult to Ophthalmology; Future; Expected date: 04/08/2025    7. Onychomycosis  Assessment & Plan:  - Provided information about toenail fungus and treatment options.    - patient declined treatment      8. Stricture of male urethra, unspecified stricture type  Overview:  Dr Bravo  Flomax 0.4 mg nightly    01/09/2025 -  cystoscopy with urethral dilation with general anesthesia per Dr. Bravo.              Follow up in about 6 months (around 10/1/2025) for follow up.     This note was generated with the assistance of ambient listening technology. Verbal consent was obtained by the patient and accompanying visitor(s) for the recording of patient appointment to facilitate this note. I attest to having reviewed and edited the generated note for accuracy, though some syntax or spelling errors may persist. Please contact the author of this note for any clarification.            [1]   Current Outpatient Medications on File Prior to Visit   Medication Sig Dispense Refill    aspirin (ECOTRIN) 81 MG EC tablet Take 1 tablet (81 mg total) by mouth once daily. 90 tablet 3    rosuvastatin (CRESTOR) 10 MG tablet Take 1 tablet (10 mg total) by mouth once daily. 90 tablet 3    tamsulosin (FLOMAX) 0.4 mg Cap Take by mouth.      [DISCONTINUED] amLODIPine (NORVASC) 5 MG tablet  Take 1 tablet (5 mg total) by mouth once daily. 90 tablet 3    [DISCONTINUED] metFORMIN (GLUCOPHAGE-XR) 500 MG ER 24hr tablet Take 1 tablet (500 mg total) by mouth daily with breakfast. 90 tablet 3     No current facility-administered medications on file prior to visit.   [2]   Social History  Socioeconomic History    Marital status:    Tobacco Use    Smoking status: Former     Current packs/day: 0.00     Types: Cigarettes     Quit date:      Years since quittin.2    Smokeless tobacco: Never   Substance and Sexual Activity    Alcohol use: Not Currently    Drug use: Never    Sexual activity: Yes     Social Drivers of Health     Financial Resource Strain: Low Risk  (3/26/2024)    Overall Financial Resource Strain (CARDIA)     Difficulty of Paying Living Expenses: Not hard at all   Food Insecurity: No Food Insecurity (3/26/2024)    Hunger Vital Sign     Worried About Running Out of Food in the Last Year: Never true     Ran Out of Food in the Last Year: Never true   Transportation Needs: No Transportation Needs (3/26/2024)    PRAPARE - Transportation     Lack of Transportation (Medical): No     Lack of Transportation (Non-Medical): No   Physical Activity: Sufficiently Active (3/26/2024)    Exercise Vital Sign     Days of Exercise per Week: 7 days     Minutes of Exercise per Session: 60 min   Stress: No Stress Concern Present (3/26/2024)    [employer] [place] [employer] - Occupational Stress Questionnaire     Feeling of Stress : Not at all   Housing Stability: Unknown (3/26/2024)    Housing Stability Vital Sign     Unable to Pay for Housing in the Last Year: No     Unstable Housing in the Last Year: No

## 2025-04-25 ENCOUNTER — TELEPHONE (OUTPATIENT)
Dept: PHARMACY | Facility: CLINIC | Age: 70
End: 2025-04-25
Payer: COMMERCIAL

## 2025-04-25 NOTE — TELEPHONE ENCOUNTER
[facility]/Population Health Chart Review & Patient Outreach Details For Medication Adherence Project    Reason for Outreach Encounter: 3rd Party payor non-compliance report ([employer], [employer], [employer], etc)  2.  Patient Outreach Method: Reviewed patient chart  and Tueet message  3.   Medication in question:    Hyperlipidemia Medications              rosuvastatin (CRESTOR) 10 MG tablet Take 1 tablet (10 mg total) by mouth once daily.                  LF 90 ds 1/2/25    4.  Reviewed and or Updates Made To: Patient Chart  5. Outreach Outcomes and/or actions taken: Patient reminded to  prescription  Additional Notes:

## 2025-05-07 ENCOUNTER — DOCUMENTATION ONLY (OUTPATIENT)
Dept: FAMILY MEDICINE | Facility: CLINIC | Age: 70
End: 2025-05-07
Payer: COMMERCIAL

## 2025-05-07 LAB
LEFT EYE DM RETINOPATHY: NEGATIVE
RIGHT EYE DM RETINOPATHY: NEGATIVE

## 2025-06-12 RX ORDER — TAMSULOSIN HYDROCHLORIDE 0.4 MG/1
0.4 CAPSULE ORAL DAILY
Qty: 90 CAPSULE | Refills: 1 | Status: SHIPPED | OUTPATIENT
Start: 2025-06-12

## 2025-07-15 ENCOUNTER — TELEPHONE (OUTPATIENT)
Dept: PHARMACY | Facility: CLINIC | Age: 70
End: 2025-07-15
Payer: COMMERCIAL

## 2025-07-16 NOTE — TELEPHONE ENCOUNTER
[facility]/Population Health Chart Review & Patient Outreach Details For Medication Adherence Project    Reason for Outreach Encounter: 3rd Party payor non-compliance report ([employer], [employer], C, etc)  2.  Patient Outreach Method: Reviewed Patient Chart  3.   Medication in question: Rosuvastatin    LAST FILLED: 7/2/25 for 90 day supply  Hyperlipidemia Medications              rosuvastatin (CRESTOR) 10 MG tablet Take 1 tablet (10 mg total) by mouth once daily.               4.  Reviewed and or Updates Made To: Patient Chart  5. Outreach Outcomes and/or actions taken: Patient filled medication and is on track to be adherent